# Patient Record
Sex: MALE | Race: WHITE | NOT HISPANIC OR LATINO | URBAN - METROPOLITAN AREA
[De-identification: names, ages, dates, MRNs, and addresses within clinical notes are randomized per-mention and may not be internally consistent; named-entity substitution may affect disease eponyms.]

---

## 2017-06-13 ENCOUNTER — OUTPATIENT (OUTPATIENT)
Dept: OUTPATIENT SERVICES | Facility: HOSPITAL | Age: 62
LOS: 1 days | End: 2017-06-13
Payer: COMMERCIAL

## 2017-06-13 VITALS
DIASTOLIC BLOOD PRESSURE: 61 MMHG | RESPIRATION RATE: 16 BRPM | HEIGHT: 70 IN | TEMPERATURE: 98 F | HEART RATE: 67 BPM | WEIGHT: 207.01 LBS | SYSTOLIC BLOOD PRESSURE: 102 MMHG

## 2017-06-13 DIAGNOSIS — Z98.89 OTHER SPECIFIED POSTPROCEDURAL STATES: Chronic | ICD-10-CM

## 2017-06-13 DIAGNOSIS — Z98.1 ARTHRODESIS STATUS: Chronic | ICD-10-CM

## 2017-06-13 DIAGNOSIS — Z01.812 ENCOUNTER FOR PREPROCEDURAL LABORATORY EXAMINATION: ICD-10-CM

## 2017-06-13 DIAGNOSIS — M16.12 UNILATERAL PRIMARY OSTEOARTHRITIS, LEFT HIP: ICD-10-CM

## 2017-06-13 LAB
ALBUMIN SERPL ELPH-MCNC: 3.8 G/DL — SIGNIFICANT CHANGE UP (ref 3.3–5)
ALP SERPL-CCNC: 96 U/L — SIGNIFICANT CHANGE UP (ref 40–120)
ALT FLD-CCNC: 25 U/L — SIGNIFICANT CHANGE UP (ref 12–78)
ANION GAP SERPL CALC-SCNC: 4 MMOL/L — LOW (ref 5–17)
APTT BLD: 31.9 SEC — SIGNIFICANT CHANGE UP (ref 27.5–37.4)
AST SERPL-CCNC: 21 U/L — SIGNIFICANT CHANGE UP (ref 15–37)
BILIRUB SERPL-MCNC: 0.6 MG/DL — SIGNIFICANT CHANGE UP (ref 0.2–1.2)
BUN SERPL-MCNC: 18 MG/DL — SIGNIFICANT CHANGE UP (ref 7–23)
CALCIUM SERPL-MCNC: 8.8 MG/DL — SIGNIFICANT CHANGE UP (ref 8.5–10.1)
CHLORIDE SERPL-SCNC: 102 MMOL/L — SIGNIFICANT CHANGE UP (ref 96–108)
CO2 SERPL-SCNC: 32 MMOL/L — HIGH (ref 22–31)
CREAT SERPL-MCNC: 0.82 MG/DL — SIGNIFICANT CHANGE UP (ref 0.5–1.3)
GLUCOSE SERPL-MCNC: 96 MG/DL — SIGNIFICANT CHANGE UP (ref 70–99)
HCT VFR BLD CALC: 44.7 % — SIGNIFICANT CHANGE UP (ref 39–50)
HGB BLD-MCNC: 15.3 G/DL — SIGNIFICANT CHANGE UP (ref 13–17)
INR BLD: 0.96 RATIO — SIGNIFICANT CHANGE UP (ref 0.88–1.16)
MCHC RBC-ENTMCNC: 31.7 PG — SIGNIFICANT CHANGE UP (ref 27–34)
MCHC RBC-ENTMCNC: 34.2 GM/DL — SIGNIFICANT CHANGE UP (ref 32–36)
MCV RBC AUTO: 92.8 FL — SIGNIFICANT CHANGE UP (ref 80–100)
MRSA PCR RESULT.: SIGNIFICANT CHANGE UP
PLATELET # BLD AUTO: 265 K/UL — SIGNIFICANT CHANGE UP (ref 150–400)
POTASSIUM SERPL-MCNC: 4.6 MMOL/L — SIGNIFICANT CHANGE UP (ref 3.5–5.3)
POTASSIUM SERPL-SCNC: 4.6 MMOL/L — SIGNIFICANT CHANGE UP (ref 3.5–5.3)
PROT SERPL-MCNC: 7.2 G/DL — SIGNIFICANT CHANGE UP (ref 6–8.3)
PROTHROM AB SERPL-ACNC: 10.5 SEC — SIGNIFICANT CHANGE UP (ref 9.8–12.7)
RBC # BLD: 4.82 M/UL — SIGNIFICANT CHANGE UP (ref 4.2–5.8)
RBC # FLD: 11.6 % — SIGNIFICANT CHANGE UP (ref 10.3–14.5)
S AUREUS DNA NOSE QL NAA+PROBE: SIGNIFICANT CHANGE UP
SODIUM SERPL-SCNC: 138 MMOL/L — SIGNIFICANT CHANGE UP (ref 135–145)
WBC # BLD: 7.5 K/UL — SIGNIFICANT CHANGE UP (ref 3.8–10.5)
WBC # FLD AUTO: 7.5 K/UL — SIGNIFICANT CHANGE UP (ref 3.8–10.5)

## 2017-06-13 PROCEDURE — 93010 ELECTROCARDIOGRAM REPORT: CPT

## 2017-06-13 PROCEDURE — 73502 X-RAY EXAM HIP UNI 2-3 VIEWS: CPT | Mod: 26,LT

## 2017-06-13 NOTE — H&P PST ADULT - NSANTHOSAYNRD_GEN_A_CORE
No. XANDER screening performed.  STOP BANG Legend: 0-2 = LOW Risk; 3-4 = INTERMEDIATE Risk; 5-8 = HIGH Risk

## 2017-06-13 NOTE — H&P PST ADULT - PMH
Back injury Back injury    High cholesterol    Primary osteoarthritis of left hip Back injury    High cholesterol    Low back pain, unspecified back pain laterality, unspecified chronicity, with sciatica presence unspecified    Pain of both hip joints    Primary osteoarthritis of left hip    Primary osteoarthritis of right hip

## 2017-06-13 NOTE — H&P PST ADULT - HISTORY OF PRESENT ILLNESS
61 yo male presents to PST with bilateral hip secondary to bone on bone OA. Reports left hip hurts more then right. Rates his pain today a 7/10. Takes Percocet and Meloxicam. H/O of back pain. S/P lumbar fusion with instrumentation in 2016. Has had 2 epidural injections this year, thus far. Ambulates with a cane. Now scheduled for a left total hip replacement on 6/21 with Dr. Leigh. States that he will have the right hip done possibly on the 27th. 63 yo male presents to PST with bilateral hip pain secondary to bone on bone OA. Reports left hip hurts more then right. Rates his pain today a 7/10. Takes Percocet and Meloxicam. H/O of back pain. S/P lumbar fusion with instrumentation in 2016. Has had 2 epidural injections this year, thus far. Ambulates with a cane. Now scheduled for a left total hip replacement on 6/21 with Dr. Leigh. States that he will have the right hip done possibly on the 27th.

## 2017-06-13 NOTE — H&P PST ADULT - ASSESSMENT
63 yo male with OA of the left hip scheduled for a left total hip replacement on 6/21 with Dr. Leigh

## 2017-06-14 DIAGNOSIS — Z01.818 ENCOUNTER FOR OTHER PREPROCEDURAL EXAMINATION: ICD-10-CM

## 2017-06-14 DIAGNOSIS — M16.12 UNILATERAL PRIMARY OSTEOARTHRITIS, LEFT HIP: ICD-10-CM

## 2017-06-20 PROCEDURE — 80053 COMPREHEN METABOLIC PANEL: CPT

## 2017-06-20 PROCEDURE — 87641 MR-STAPH DNA AMP PROBE: CPT

## 2017-06-20 PROCEDURE — 73502 X-RAY EXAM HIP UNI 2-3 VIEWS: CPT

## 2017-06-20 PROCEDURE — 87640 STAPH A DNA AMP PROBE: CPT

## 2017-06-20 PROCEDURE — 85610 PROTHROMBIN TIME: CPT

## 2017-06-20 PROCEDURE — 86901 BLOOD TYPING SEROLOGIC RH(D): CPT

## 2017-06-20 PROCEDURE — 86900 BLOOD TYPING SEROLOGIC ABO: CPT

## 2017-06-20 PROCEDURE — 85730 THROMBOPLASTIN TIME PARTIAL: CPT

## 2017-06-20 PROCEDURE — 86850 RBC ANTIBODY SCREEN: CPT

## 2017-06-20 PROCEDURE — G0463: CPT

## 2017-06-20 PROCEDURE — 86920 COMPATIBILITY TEST SPIN: CPT

## 2017-06-20 PROCEDURE — 93005 ELECTROCARDIOGRAM TRACING: CPT

## 2017-06-20 PROCEDURE — 85027 COMPLETE CBC AUTOMATED: CPT

## 2017-06-20 RX ORDER — CELECOXIB 200 MG/1
200 CAPSULE ORAL
Qty: 0 | Refills: 0 | Status: DISCONTINUED | OUTPATIENT
Start: 2017-06-21 | End: 2017-06-26

## 2017-06-20 RX ORDER — HYDROMORPHONE HYDROCHLORIDE 2 MG/ML
4 INJECTION INTRAMUSCULAR; INTRAVENOUS; SUBCUTANEOUS EVERY 4 HOURS
Qty: 0 | Refills: 0 | Status: DISCONTINUED | OUTPATIENT
Start: 2017-06-21 | End: 2017-06-26

## 2017-06-20 RX ORDER — ATORVASTATIN CALCIUM 80 MG/1
10 TABLET, FILM COATED ORAL AT BEDTIME
Qty: 0 | Refills: 0 | Status: DISCONTINUED | OUTPATIENT
Start: 2017-06-21 | End: 2017-06-26

## 2017-06-20 RX ORDER — ASCORBIC ACID 60 MG
500 TABLET,CHEWABLE ORAL
Qty: 0 | Refills: 0 | Status: DISCONTINUED | OUTPATIENT
Start: 2017-06-21 | End: 2017-06-26

## 2017-06-20 RX ORDER — FERROUS SULFATE 325(65) MG
325 TABLET ORAL
Qty: 0 | Refills: 0 | Status: DISCONTINUED | OUTPATIENT
Start: 2017-06-21 | End: 2017-06-26

## 2017-06-20 RX ORDER — DOCUSATE SODIUM 100 MG
100 CAPSULE ORAL THREE TIMES A DAY
Qty: 0 | Refills: 0 | Status: DISCONTINUED | OUTPATIENT
Start: 2017-06-21 | End: 2017-06-26

## 2017-06-20 RX ORDER — ACETAMINOPHEN 500 MG
650 TABLET ORAL EVERY 8 HOURS
Qty: 0 | Refills: 0 | Status: COMPLETED | OUTPATIENT
Start: 2017-06-22 | End: 2017-06-24

## 2017-06-20 RX ORDER — FOLIC ACID 0.8 MG
1 TABLET ORAL DAILY
Qty: 0 | Refills: 0 | Status: DISCONTINUED | OUTPATIENT
Start: 2017-06-21 | End: 2017-06-26

## 2017-06-20 RX ORDER — PANTOPRAZOLE SODIUM 20 MG/1
40 TABLET, DELAYED RELEASE ORAL
Qty: 0 | Refills: 0 | Status: DISCONTINUED | OUTPATIENT
Start: 2017-06-21 | End: 2017-06-26

## 2017-06-20 RX ORDER — SODIUM CHLORIDE 9 MG/ML
1000 INJECTION, SOLUTION INTRAVENOUS
Qty: 0 | Refills: 0 | Status: DISCONTINUED | OUTPATIENT
Start: 2017-06-21 | End: 2017-06-21

## 2017-06-20 RX ORDER — ONDANSETRON 8 MG/1
4 TABLET, FILM COATED ORAL EVERY 6 HOURS
Qty: 0 | Refills: 0 | Status: DISCONTINUED | OUTPATIENT
Start: 2017-06-21 | End: 2017-06-26

## 2017-06-20 RX ORDER — HYDROMORPHONE HYDROCHLORIDE 2 MG/ML
2 INJECTION INTRAMUSCULAR; INTRAVENOUS; SUBCUTANEOUS EVERY 4 HOURS
Qty: 0 | Refills: 0 | Status: DISCONTINUED | OUTPATIENT
Start: 2017-06-21 | End: 2017-06-26

## 2017-06-20 RX ORDER — GABAPENTIN 400 MG/1
300 CAPSULE ORAL THREE TIMES A DAY
Qty: 0 | Refills: 0 | Status: DISCONTINUED | OUTPATIENT
Start: 2017-06-21 | End: 2017-06-26

## 2017-06-21 ENCOUNTER — INPATIENT (INPATIENT)
Facility: HOSPITAL | Age: 62
LOS: 7 days | Discharge: HOME HEALTH | DRG: 462 | End: 2017-06-29
Attending: ORTHOPAEDIC SURGERY | Admitting: ORTHOPAEDIC SURGERY
Payer: COMMERCIAL

## 2017-06-21 VITALS
SYSTOLIC BLOOD PRESSURE: 105 MMHG | DIASTOLIC BLOOD PRESSURE: 64 MMHG | HEIGHT: 70 IN | WEIGHT: 207.01 LBS | TEMPERATURE: 98 F | HEART RATE: 66 BPM | OXYGEN SATURATION: 98 % | RESPIRATION RATE: 13 BRPM

## 2017-06-21 DIAGNOSIS — Z98.1 ARTHRODESIS STATUS: Chronic | ICD-10-CM

## 2017-06-21 DIAGNOSIS — M16.12 UNILATERAL PRIMARY OSTEOARTHRITIS, LEFT HIP: ICD-10-CM

## 2017-06-21 DIAGNOSIS — E78.00 PURE HYPERCHOLESTEROLEMIA, UNSPECIFIED: ICD-10-CM

## 2017-06-21 DIAGNOSIS — Z98.89 OTHER SPECIFIED POSTPROCEDURAL STATES: Chronic | ICD-10-CM

## 2017-06-21 DIAGNOSIS — Z29.9 ENCOUNTER FOR PROPHYLACTIC MEASURES, UNSPECIFIED: ICD-10-CM

## 2017-06-21 LAB
HCT VFR BLD CALC: 41.7 % — SIGNIFICANT CHANGE UP (ref 39–50)
HGB BLD-MCNC: 14.3 G/DL — SIGNIFICANT CHANGE UP (ref 13–17)

## 2017-06-21 PROCEDURE — 99281 EMR DPT VST MAYX REQ PHY/QHP: CPT

## 2017-06-21 PROCEDURE — 73501 X-RAY EXAM HIP UNI 1 VIEW: CPT | Mod: 26,LT

## 2017-06-21 PROCEDURE — 88305 TISSUE EXAM BY PATHOLOGIST: CPT | Mod: 26

## 2017-06-21 PROCEDURE — 88311 DECALCIFY TISSUE: CPT | Mod: 26

## 2017-06-21 RX ORDER — ACETAMINOPHEN 500 MG
1000 TABLET ORAL ONCE
Qty: 0 | Refills: 0 | Status: COMPLETED | OUTPATIENT
Start: 2017-06-21 | End: 2017-06-21

## 2017-06-21 RX ORDER — SODIUM CHLORIDE 9 MG/ML
1000 INJECTION, SOLUTION INTRAVENOUS
Qty: 0 | Refills: 0 | Status: DISCONTINUED | OUTPATIENT
Start: 2017-06-21 | End: 2017-06-21

## 2017-06-21 RX ORDER — MORPHINE SULFATE 50 MG/1
2 CAPSULE, EXTENDED RELEASE ORAL EVERY 4 HOURS
Qty: 0 | Refills: 0 | Status: DISCONTINUED | OUTPATIENT
Start: 2017-06-21 | End: 2017-06-26

## 2017-06-21 RX ORDER — ENOXAPARIN SODIUM 100 MG/ML
40 INJECTION SUBCUTANEOUS DAILY
Qty: 0 | Refills: 0 | Status: COMPLETED | OUTPATIENT
Start: 2017-06-22 | End: 2017-06-25

## 2017-06-21 RX ORDER — MELOXICAM 15 MG/1
1 TABLET ORAL
Qty: 0 | Refills: 0 | COMMUNITY

## 2017-06-21 RX ORDER — CEFAZOLIN SODIUM 1 G
1000 VIAL (EA) INJECTION EVERY 6 HOURS
Qty: 0 | Refills: 0 | Status: COMPLETED | OUTPATIENT
Start: 2017-06-21 | End: 2017-06-22

## 2017-06-21 RX ORDER — CEFAZOLIN SODIUM 1 G
2000 VIAL (EA) INJECTION ONCE
Qty: 0 | Refills: 0 | Status: COMPLETED | OUTPATIENT
Start: 2017-06-21 | End: 2017-06-21

## 2017-06-21 RX ORDER — BUPIVACAINE 13.3 MG/ML
20 INJECTION, SUSPENSION, LIPOSOMAL INFILTRATION ONCE
Qty: 0 | Refills: 0 | Status: DISCONTINUED | OUTPATIENT
Start: 2017-06-21 | End: 2017-06-21

## 2017-06-21 RX ORDER — HYDROMORPHONE HYDROCHLORIDE 2 MG/ML
0.5 INJECTION INTRAMUSCULAR; INTRAVENOUS; SUBCUTANEOUS
Qty: 0 | Refills: 0 | Status: DISCONTINUED | OUTPATIENT
Start: 2017-06-21 | End: 2017-06-21

## 2017-06-21 RX ORDER — GABAPENTIN 400 MG/1
1 CAPSULE ORAL
Qty: 0 | Refills: 0 | COMMUNITY

## 2017-06-21 RX ORDER — ATORVASTATIN CALCIUM 80 MG/1
1 TABLET, FILM COATED ORAL
Qty: 0 | Refills: 0 | COMMUNITY

## 2017-06-21 RX ORDER — CYCLOBENZAPRINE HYDROCHLORIDE 10 MG/1
10 TABLET, FILM COATED ORAL
Qty: 0 | Refills: 0 | COMMUNITY

## 2017-06-21 RX ORDER — ACETAMINOPHEN 500 MG
1000 TABLET ORAL ONCE
Qty: 0 | Refills: 0 | Status: COMPLETED | OUTPATIENT
Start: 2017-06-22 | End: 2017-06-22

## 2017-06-21 RX ADMIN — SODIUM CHLORIDE 150 MILLILITER(S): 9 INJECTION, SOLUTION INTRAVENOUS at 14:01

## 2017-06-21 RX ADMIN — HYDROMORPHONE HYDROCHLORIDE 2 MILLIGRAM(S): 2 INJECTION INTRAMUSCULAR; INTRAVENOUS; SUBCUTANEOUS at 15:31

## 2017-06-21 RX ADMIN — SODIUM CHLORIDE 30 MILLILITER(S): 9 INJECTION, SOLUTION INTRAVENOUS at 08:50

## 2017-06-21 RX ADMIN — CELECOXIB 200 MILLIGRAM(S): 200 CAPSULE ORAL at 18:02

## 2017-06-21 RX ADMIN — Medication 1000 MILLIGRAM(S): at 18:00

## 2017-06-21 RX ADMIN — Medication 100 MILLIGRAM(S): at 16:29

## 2017-06-21 RX ADMIN — HYDROMORPHONE HYDROCHLORIDE 4 MILLIGRAM(S): 2 INJECTION INTRAMUSCULAR; INTRAVENOUS; SUBCUTANEOUS at 21:51

## 2017-06-21 RX ADMIN — Medication 325 MILLIGRAM(S): at 18:02

## 2017-06-21 RX ADMIN — GABAPENTIN 300 MILLIGRAM(S): 400 CAPSULE ORAL at 21:14

## 2017-06-21 RX ADMIN — Medication 100 MILLIGRAM(S): at 21:14

## 2017-06-21 RX ADMIN — HYDROMORPHONE HYDROCHLORIDE 0.5 MILLIGRAM(S): 2 INJECTION INTRAMUSCULAR; INTRAVENOUS; SUBCUTANEOUS at 14:00

## 2017-06-21 RX ADMIN — ATORVASTATIN CALCIUM 10 MILLIGRAM(S): 80 TABLET, FILM COATED ORAL at 21:14

## 2017-06-21 RX ADMIN — HYDROMORPHONE HYDROCHLORIDE 0.5 MILLIGRAM(S): 2 INJECTION INTRAMUSCULAR; INTRAVENOUS; SUBCUTANEOUS at 14:15

## 2017-06-21 RX ADMIN — HYDROMORPHONE HYDROCHLORIDE 0.5 MILLIGRAM(S): 2 INJECTION INTRAMUSCULAR; INTRAVENOUS; SUBCUTANEOUS at 13:30

## 2017-06-21 RX ADMIN — Medication 500 MILLIGRAM(S): at 18:02

## 2017-06-21 RX ADMIN — HYDROMORPHONE HYDROCHLORIDE 0.5 MILLIGRAM(S): 2 INJECTION INTRAMUSCULAR; INTRAVENOUS; SUBCUTANEOUS at 13:40

## 2017-06-21 RX ADMIN — Medication 400 MILLIGRAM(S): at 17:10

## 2017-06-21 RX ADMIN — HYDROMORPHONE HYDROCHLORIDE 4 MILLIGRAM(S): 2 INJECTION INTRAMUSCULAR; INTRAVENOUS; SUBCUTANEOUS at 20:51

## 2017-06-21 RX ADMIN — CELECOXIB 200 MILLIGRAM(S): 200 CAPSULE ORAL at 19:02

## 2017-06-21 RX ADMIN — Medication 100 MILLIGRAM(S): at 22:29

## 2017-06-21 RX ADMIN — HYDROMORPHONE HYDROCHLORIDE 0.5 MILLIGRAM(S): 2 INJECTION INTRAMUSCULAR; INTRAVENOUS; SUBCUTANEOUS at 13:15

## 2017-06-21 NOTE — DISCHARGE NOTE ADULT - MEDICATION SUMMARY - MEDICATIONS TO TAKE
I will START or STAY ON the medications listed below when I get home from the hospital:    celecoxib 200 mg oral capsule  -- 1 cap(s) by mouth 2 times a day (after meals)  -- Indication: For Pain     HYDROmorphone 2 mg oral tablet  -- 1 tab(s) by mouth every 6 hours MDD:4  -- Indication: For Pain    Aspirin Enteric Coated 325 mg oral delayed release tablet  -- 1 tab(s) by mouth 2 times a day  -- Indication: For DVT PORPHYLAXIS, TO PREVENT BLOO CLOT    meloxicam 15 mg oral tablet  -- 1 tab(s) by mouth once a day  -- Indication: For HOME MEDICATION     gabapentin 300 mg oral capsule  -- 1 cap(s) by mouth 3 times a day  -- Indication: For HOME MEDICATION     atorvastatin 10 mg oral tablet  -- 1 tab(s) by mouth once a day  -- Indication: For HOME MEDICATION     cyclobenzaprine 10 mg oral tablet  -- 1 tab(s) by mouth every 8 hours, As Needed MDD:3  -- Indication: For HOME MEDICATION     Flexeril  -- 10 milligram(s) by mouth 3 times a day, As Needed - for severe pain  -- Indication: For HOME MEDICATION     pantoprazole 40 mg oral delayed release tablet  -- 1 tab(s) by mouth once a day  -- Indication: For PrEVENT STRESS ULCER

## 2017-06-21 NOTE — DISCHARGE NOTE ADULT - CARE PLAN
Principal Discharge DX:	Osteoarthritis of hips, bilateral  Goal:	PHYSICAL THERAPY  Instructions for follow-up, activity and diet:	WEIGHT BEARING AS TOLERATED.  FOLLOW UP WITH DR. FLOWERS NEXT THURSDAY 07/06/2017 CALL [phone] FOR AN APPOINTMENT.  KEEP HIP AQUACEL  DRESSING  ON DRY AND CLEAN, IT WILL BE CHANGED OR REMOVED ON YOUR NEXT OFFICE VISIT.  Secondary Diagnosis:	High cholesterol Principal Discharge DX:	Osteoarthritis of hips, bilateral  Goal:	PHYSICAL THERAPY  Instructions for follow-up, activity and diet:	WEIGHT BEARING AS TOLERATED.  FOLLOW UP WITH DR. FLOWERS NEXT THURSDAY 07/06/2017 CALL [phone] FOR AN APPOINTMENT.  KEEP HIP AQUACEL  DRESSING  ON DRY AND CLEAN, IT WILL BE CHANGED OR REMOVED ON YOUR NEXT OFFICE VISIT.  Secondary Diagnosis:	High cholesterol Principal Discharge DX:	Osteoarthritis of hips, bilateral  Goal:	PHYSICAL THERAPY  Instructions for follow-up, activity and diet:	WEIGHT BEARING AS TOLERATED.  FOLLOW UP WITH DR. FLOWERS NEXT THURSDAY 07/06/2017 CALL [phone] FOR AN APPOINTMENT.  KEEP HIP AQUACEL  DRESSING  ON DRY AND CLEAN, IT WILL BE CHANGED OR REMOVED ON YOUR NEXT OFFICE VISIT.  Secondary Diagnosis:	High cholesterol

## 2017-06-21 NOTE — PATIENT PROFILE ADULT. - PMH
Back injury    High cholesterol    Low back pain, unspecified back pain laterality, unspecified chronicity, with sciatica presence unspecified    Pain of both hip joints    Primary osteoarthritis of left hip    Primary osteoarthritis of right hip

## 2017-06-21 NOTE — CONSULT NOTE ADULT - CONSULT REQUESTED DATE/TIME
Pt states she needs to have refill of her medicine  Refill: fluticasone nasal spray   [facility]  
Refill done   
21-Jun-2017 15:56

## 2017-06-21 NOTE — DISCHARGE NOTE ADULT - PLAN OF CARE
PHYSICAL THERAPY WEIGHT BEARING AS TOLERATED.  FOLLOW UP WITH DR. FLOWERS NEXT THURSDAY 07/06/2017 CALL [phone] FOR AN APPOINTMENT.  KEEP HIP AQUACEL  DRESSING  ON DRY AND CLEAN, IT WILL BE CHANGED OR REMOVED ON YOUR NEXT OFFICE VISIT.

## 2017-06-21 NOTE — PROGRESS NOTE ADULT - ASSESSMENT
62M s/p left DUDLEY POD# 0    WBAT  Pain control  DVT prophylaxis  Abduction while supine/seated  PT  Discharge planning

## 2017-06-21 NOTE — DISCHARGE NOTE ADULT - DURABLE MEDICAL EQUIPMENT AGENCY
[facility] [phone] all [facility] medical provided hip kit /  [phone]    patient already has a hip kit and 3:1 commode from prior

## 2017-06-21 NOTE — CONSULT NOTE ADULT - SUBJECTIVE AND OBJECTIVE BOX
Patient is a 62y old  Male who presents with a chief complaint of "left hip on the 21st, then the right hip later" (13 Jun 2017 13:15) now s/p left thr.           pmd: wing    PAST MEDICAL & SURGICAL HISTORY:  Low back pain, unspecified back pain laterality, unspecified chronicity, with sciatica presence unspecified  Primary osteoarthritis of right hip  Pain of both hip joints  Primary osteoarthritis of left hip  High cholesterol  Back injury  S/P lumbar spinal fusion: June 2016  S/P left knee arthroscopy      REVIEW OF SYSTEMS:  CONSTITUTIONAL: No fever, weight loss, or fatigue  EYES: No eye pain, visual disturbances, or discharge  ENMT:  No difficulty hearing, tinnitus, vertigo; No sinus or throat pain  NECK: No pain or stiffness  BREASTS: No pain, masses, or nipple discharge  RESPIRATORY: No cough, wheezing, chills or hemoptysis; No shortness of breath  CARDIOVASCULAR: No chest pain, palpitations, dizziness, or leg swelling  GASTROINTESTINAL: No abdominal or epigastric pain. No nausea, vomiting, or hematemesis; No diarrhea or constipation. No melena or hematochezia.  GENITOURINARY: No dysuria, frequency, hematuria, or incontinence  NEUROLOGICAL: No headaches, memory loss, loss of strength, numbness, or tremors  SKIN: No itching, burning, rashes, or lesions   LYMPH NODES: No enlarged glands  ENDOCRINE: No heat or cold intolerance; No hair loss  MUSCULOSKELETAL: No muscle or back pain  PSYCHIATRIC: No depression, anxiety, mood swings, or difficulty sleeping  HEME/LYMPH: No easy bruising, or bleeding gums  ALLERGY AND IMMUNOLOGIC: No hives or eczema     HOME MEDS  · 	acetaminophen-oxyCODONE 325 mg-10 mg oral tablet: Last Dose Taken:  , 1 tab(s) orally every 4 hours, As Needed MDD:6  · 	cyclobenzaprine 10 mg oral tablet: Last Dose Taken:  , 1 tab(s) orally every 8 hours, As Needed MDD:3  · 	gabapentin 300 mg oral capsule: Last Dose Taken:  , 1 cap(s) orally 3 times a day  · 	meloxicam 15 mg oral tablet: Last Dose Taken:  , 1 tab(s) orally once a day  · 	atorvastatin 10 mg oral tablet: Last Dose Taken:  , 1 tab(s) orally once a day    MEDICATIONS  (STANDING):  lactated ringers. 1000milliLiter(s) IV Continuous <Continuous>  celecoxib 200milliGRAM(s) Oral two times a day after meals  docusate sodium 100milliGRAM(s) Oral three times a day  ferrous    sulfate 325milliGRAM(s) Oral three times a day with meals  folic acid 1milliGRAM(s) Oral daily  multivitamin 1Tablet(s) Oral daily  ascorbic acid 500milliGRAM(s) Oral two times a day  ceFAZolin   IVPB 1000milliGRAM(s) IV Intermittent every 6 hours  gabapentin 300milliGRAM(s) Oral three times a day  atorvastatin 10milliGRAM(s) Oral at bedtime  pantoprazole    Tablet 40milliGRAM(s) Oral before breakfast  acetaminophen  IVPB. 1000milliGRAM(s) IV Intermittent once    MEDICATIONS  (PRN):  ondansetron Injectable 4milliGRAM(s) IV Push every 6 hours PRN Nausea and/or Vomiting  HYDROmorphone   Tablet 2milliGRAM(s) Oral every 4 hours PRN Moderate Pain (4 - 6)  HYDROmorphone   Tablet 4milliGRAM(s) Oral every 4 hours PRN Severe Pain (7 - 10)  morphine  - Injectable 2milliGRAM(s) IV Push every 4 hours PRN Severe Pain (7 - 10)        No Known Allergies          SOCIAL HISTORY  tobacco:  alcohol:  substance use:  marital status:          PHYSICAL EXAM  Height (cm): 177.8 (06-21 @ 08:27), 177.8 (06-13 @ 16:52)  Weight (kg): 93.9 (06-21 @ 08:27), 93.9 (06-13 @ 16:52)  BMI (kg/m2): 29.7 (06-21 @ 08:27), 29.7 (06-13 @ 16:52)  BSA (m2): 2.12 (06-21 @ 08:27), 2.12 (06-13 @ 16:52)  Vital Signs Last 24 Hrs  T(C): 36.4, Max: 37.1 (06-21 @ 13:10)  T(F): 97.6, Max: 98.8 (06-21 @ 13:10)  HR: 91 (66 - 91)  BP: 118/80 (105/64 - 142/90)  BP(mean): --  RR: 16 (13 - 16)  SpO2: 100% (98% - 100%)    GENERAL: NAD, well-groomed, well-developed  HEAD:  Atraumatic, Normocephalic  EYES: EOMI, PERRLA, conjunctiva and sclera clear  ENMT: No tonsillar erythema, exudates, or enlargement  NECK: Supple, No JVD  NERVOUS SYSTEM:  Alert & Oriented X3, Good concentration; Motor Strength 5/5 B/L upper and lower extremities; DTRs 2+ intact and symmetric  CHEST/LUNG: Clear to percussion bilaterally; No rales, rhonchi, wheezing, or rubs  HEART: Regular rate and rhythm; No murmurs, rubs, or gallops  ABDOMEN: Soft, Nontender, Nondistended; Bowel sounds present  EXTREMITIES:  2+ Peripheral Pulses, No clubbing, cyanosis, or edema  LYMPH: No lymphadenopathy noted  SKIN: No rashes or lesions      LABS:                        14.3   x     )-----------( x        ( 21 Jun 2017 14:22 )             41.7               CAPILLARY BLOOD GLUCOSE      RADIOLOGY & ADDITIONAL STUDIES: Patient is a 62y old  Male who presents with a chief complaint of "left hip on the 21st, then the right hip later" (13 Jun 2017 13:15) now s/p left thr. pt seen and examined no chest pain or sob.           pmd: wing    PAST MEDICAL & SURGICAL HISTORY:  Low back pain, unspecified back pain laterality, unspecified chronicity, with sciatica presence unspecified  Primary osteoarthritis of right hip  Pain of both hip joints  Primary osteoarthritis of left hip  High cholesterol  Back injury  S/P lumbar spinal fusion: June 2016  S/P left knee arthroscopy      REVIEW OF SYSTEMS:  CONSTITUTIONAL: No fever, weight loss, or fatigue  EYES: No eye pain, visual disturbances, or discharge  ENMT:  No difficulty hearing, tinnitus, vertigo; No sinus or throat pain  NECK: No pain or stiffness  BREASTS: No pain, masses, or nipple discharge  RESPIRATORY: No cough, wheezing, chills or hemoptysis; No shortness of breath  CARDIOVASCULAR: No chest pain, palpitations, dizziness, or leg swelling  GASTROINTESTINAL: No abdominal or epigastric pain. No nausea, vomiting, or hematemesis; No diarrhea or constipation. No melena or hematochezia.  GENITOURINARY: No dysuria, frequency, hematuria, or incontinence  NEUROLOGICAL: No headaches, memory loss, loss of strength, numbness, or tremors  SKIN: No itching, burning, rashes, or lesions   LYMPH NODES: No enlarged glands  ENDOCRINE: No heat or cold intolerance; No hair loss  MUSCULOSKELETAL: pain surg site and left anterior thigh  PSYCHIATRIC: No depression, anxiety, mood swings, or difficulty sleeping  HEME/LYMPH: No easy bruising, or bleeding gums  ALLERGY AND IMMUNOLOGIC: No hives or eczema     HOME MEDS  · 	acetaminophen-oxyCODONE 325 mg-10 mg oral tablet: Last Dose Taken:  , 1 tab(s) orally every 4 hours, As Needed MDD:6  · 	cyclobenzaprine 10 mg oral tablet: Last Dose Taken:  , 1 tab(s) orally every 8 hours, As Needed MDD:3  · 	gabapentin 300 mg oral capsule: Last Dose Taken:  , 1 cap(s) orally 3 times a day  · 	meloxicam 15 mg oral tablet: Last Dose Taken:  , 1 tab(s) orally once a day  · 	atorvastatin 10 mg oral tablet: Last Dose Taken:  , 1 tab(s) orally once a day    MEDICATIONS  (STANDING):  lactated ringers. 1000milliLiter(s) IV Continuous <Continuous>  celecoxib 200milliGRAM(s) Oral two times a day after meals  docusate sodium 100milliGRAM(s) Oral three times a day  ferrous    sulfate 325milliGRAM(s) Oral three times a day with meals  folic acid 1milliGRAM(s) Oral daily  multivitamin 1Tablet(s) Oral daily  ascorbic acid 500milliGRAM(s) Oral two times a day  ceFAZolin   IVPB 1000milliGRAM(s) IV Intermittent every 6 hours  gabapentin 300milliGRAM(s) Oral three times a day  atorvastatin 10milliGRAM(s) Oral at bedtime  pantoprazole    Tablet 40milliGRAM(s) Oral before breakfast  acetaminophen  IVPB. 1000milliGRAM(s) IV Intermittent once    MEDICATIONS  (PRN):  ondansetron Injectable 4milliGRAM(s) IV Push every 6 hours PRN Nausea and/or Vomiting  HYDROmorphone   Tablet 2milliGRAM(s) Oral every 4 hours PRN Moderate Pain (4 - 6)  HYDROmorphone   Tablet 4milliGRAM(s) Oral every 4 hours PRN Severe Pain (7 - 10)  morphine  - Injectable 2milliGRAM(s) IV Push every 4 hours PRN Severe Pain (7 - 10)        No Known Allergies          SOCIAL HISTORY  tobacco: no  alcohol: no  substance use: no  marital status:           PHYSICAL EXAM  Height (cm): 177.8 (06-21 @ 08:27), 177.8 (06-13 @ 16:52)  Weight (kg): 93.9 (06-21 @ 08:27), 93.9 (06-13 @ 16:52)  BMI (kg/m2): 29.7 (06-21 @ 08:27), 29.7 (06-13 @ 16:52)  BSA (m2): 2.12 (06-21 @ 08:27), 2.12 (06-13 @ 16:52)  Vital Signs Last 24 Hrs  T(C): 36.4, Max: 37.1 (06-21 @ 13:10)  T(F): 97.6, Max: 98.8 (06-21 @ 13:10)  HR: 91 (66 - 91)  BP: 118/80 (105/64 - 142/90)  BP(mean): --  RR: 16 (13 - 16)  SpO2: 100% (98% - 100%) on 2 liters o2    GENERAL: NAD, well-groomed, well-developed  HEAD:  Atraumatic, Normocephalic  EYES: EOMI, PERRLA, conjunctiva and sclera clear  ENMT: No tonsillar erythema, exudates, or enlargement  NECK: Supple, No JVD  NERVOUS SYSTEM:  Alert & Oriented X3, Good concentration; Motor Strength 5/5 B/L upper and lower extremities; DTRs 2+ intact and symmetric  CHEST/LUNG: Clear to percussion bilaterally; No rales, rhonchi, wheezing, or rubs  HEART: Regular rate and rhythm; No murmurs, rubs, or gallops  ABDOMEN: Soft, Nontender, Nondistended; Bowel sounds present  EXTREMITIES:  2+ Peripheral Pulses, No clubbing, cyanosis, or edema pain left surg site, dressing intact, pain  left anterior thigh   LYMPH: No lymphadenopathy noted  SKIN: No rashes or lesions      LABS:                        14.3   x     )-----------( x        ( 21 Jun 2017 14:22 )             41.7               CAPILLARY BLOOD GLUCOSE      RADIOLOGY & ADDITIONAL STUDIES:

## 2017-06-21 NOTE — DISCHARGE NOTE ADULT - PATIENT PORTAL LINK FT
“You can access the Follow[employer] Patient Portal, offered by [facility], by registering with the following website: [email or web address]”

## 2017-06-21 NOTE — CONSULT NOTE ADULT - PROBLEM SELECTOR RECOMMENDATION 9
physical ther eval  cont celebrex  daily labs ortho follow up pain meds prn   may have right hip done as per dr lott

## 2017-06-21 NOTE — PHYSICAL THERAPY INITIAL EVALUATION ADULT - RANGE OF MOTION EXAMINATION, REHAB EVAL
L THP/bilateral upper extremity ROM was WFL (within functional limits)/bilateral lower extremity ROM was WFL (within functional limits)

## 2017-06-21 NOTE — DISCHARGE NOTE ADULT - CARE PROVIDER_API CALL
Matthew Leigh), Orthopaedic Surgery  [address] [place] [address]  Phone: [phone]  Fax: [phone]

## 2017-06-21 NOTE — DISCHARGE NOTE ADULT - MEDICATION SUMMARY - MEDICATIONS TO STOP TAKING
I will STOP taking the medications listed below when I get home from the hospital:    acetaminophen-oxyCODONE 325 mg-10 mg oral tablet  -- 1 tab(s) by mouth every 4 hours, As Needed MDD:6  -- Caution federal law prohibits the transfer of this drug to any person other  than the person for whom it was prescribed.  May cause drowsiness.  Alcohol may intensify this effect.  Use care when operating dangerous machinery.  This prescription cannot be refilled.  This product contains acetaminophen.  Do not use  with any other product containing acetaminophen to prevent possible liver damage.  Using more of this medication than prescribed may cause serious breathing problems.

## 2017-06-22 LAB
ANION GAP SERPL CALC-SCNC: 4 MMOL/L — LOW (ref 5–17)
BUN SERPL-MCNC: 17 MG/DL — SIGNIFICANT CHANGE UP (ref 7–23)
CALCIUM SERPL-MCNC: 8.5 MG/DL — SIGNIFICANT CHANGE UP (ref 8.5–10.1)
CHLORIDE SERPL-SCNC: 103 MMOL/L — SIGNIFICANT CHANGE UP (ref 96–108)
CO2 SERPL-SCNC: 30 MMOL/L — SIGNIFICANT CHANGE UP (ref 22–31)
CREAT SERPL-MCNC: 0.8 MG/DL — SIGNIFICANT CHANGE UP (ref 0.5–1.3)
GLUCOSE SERPL-MCNC: 115 MG/DL — HIGH (ref 70–99)
HCT VFR BLD CALC: 38.1 % — LOW (ref 39–50)
HGB BLD-MCNC: 13 G/DL — SIGNIFICANT CHANGE UP (ref 13–17)
POTASSIUM SERPL-MCNC: 4.6 MMOL/L — SIGNIFICANT CHANGE UP (ref 3.5–5.3)
POTASSIUM SERPL-SCNC: 4.6 MMOL/L — SIGNIFICANT CHANGE UP (ref 3.5–5.3)
SODIUM SERPL-SCNC: 137 MMOL/L — SIGNIFICANT CHANGE UP (ref 135–145)

## 2017-06-22 RX ORDER — ZOLPIDEM TARTRATE 10 MG/1
5 TABLET ORAL AT BEDTIME
Qty: 0 | Refills: 0 | Status: DISCONTINUED | OUTPATIENT
Start: 2017-06-22 | End: 2017-06-26

## 2017-06-22 RX ADMIN — Medication 500 MILLIGRAM(S): at 05:44

## 2017-06-22 RX ADMIN — Medication 1000 MILLIGRAM(S): at 03:13

## 2017-06-22 RX ADMIN — HYDROMORPHONE HYDROCHLORIDE 4 MILLIGRAM(S): 2 INJECTION INTRAMUSCULAR; INTRAVENOUS; SUBCUTANEOUS at 10:00

## 2017-06-22 RX ADMIN — Medication 100 MILLIGRAM(S): at 13:03

## 2017-06-22 RX ADMIN — HYDROMORPHONE HYDROCHLORIDE 4 MILLIGRAM(S): 2 INJECTION INTRAMUSCULAR; INTRAVENOUS; SUBCUTANEOUS at 18:48

## 2017-06-22 RX ADMIN — Medication 650 MILLIGRAM(S): at 13:03

## 2017-06-22 RX ADMIN — Medication 650 MILLIGRAM(S): at 22:36

## 2017-06-22 RX ADMIN — Medication 325 MILLIGRAM(S): at 08:59

## 2017-06-22 RX ADMIN — GABAPENTIN 300 MILLIGRAM(S): 400 CAPSULE ORAL at 05:44

## 2017-06-22 RX ADMIN — Medication 325 MILLIGRAM(S): at 13:03

## 2017-06-22 RX ADMIN — Medication 100 MILLIGRAM(S): at 05:44

## 2017-06-22 RX ADMIN — Medication 1 MILLIGRAM(S): at 13:03

## 2017-06-22 RX ADMIN — ENOXAPARIN SODIUM 40 MILLIGRAM(S): 100 INJECTION SUBCUTANEOUS at 13:03

## 2017-06-22 RX ADMIN — ATORVASTATIN CALCIUM 10 MILLIGRAM(S): 80 TABLET, FILM COATED ORAL at 22:36

## 2017-06-22 RX ADMIN — CELECOXIB 200 MILLIGRAM(S): 200 CAPSULE ORAL at 10:00

## 2017-06-22 RX ADMIN — CELECOXIB 200 MILLIGRAM(S): 200 CAPSULE ORAL at 08:59

## 2017-06-22 RX ADMIN — Medication 100 MILLIGRAM(S): at 22:36

## 2017-06-22 RX ADMIN — PANTOPRAZOLE SODIUM 40 MILLIGRAM(S): 20 TABLET, DELAYED RELEASE ORAL at 05:44

## 2017-06-22 RX ADMIN — CELECOXIB 200 MILLIGRAM(S): 200 CAPSULE ORAL at 18:48

## 2017-06-22 RX ADMIN — Medication 325 MILLIGRAM(S): at 17:35

## 2017-06-22 RX ADMIN — Medication 500 MILLIGRAM(S): at 17:36

## 2017-06-22 RX ADMIN — Medication 400 MILLIGRAM(S): at 02:58

## 2017-06-22 RX ADMIN — Medication 1 TABLET(S): at 17:36

## 2017-06-22 RX ADMIN — Medication 100 MILLIGRAM(S): at 04:23

## 2017-06-22 RX ADMIN — Medication 650 MILLIGRAM(S): at 05:44

## 2017-06-22 RX ADMIN — CELECOXIB 200 MILLIGRAM(S): 200 CAPSULE ORAL at 17:35

## 2017-06-22 RX ADMIN — HYDROMORPHONE HYDROCHLORIDE 4 MILLIGRAM(S): 2 INJECTION INTRAMUSCULAR; INTRAVENOUS; SUBCUTANEOUS at 17:36

## 2017-06-22 RX ADMIN — GABAPENTIN 300 MILLIGRAM(S): 400 CAPSULE ORAL at 17:36

## 2017-06-22 RX ADMIN — GABAPENTIN 300 MILLIGRAM(S): 400 CAPSULE ORAL at 22:36

## 2017-06-22 RX ADMIN — HYDROMORPHONE HYDROCHLORIDE 4 MILLIGRAM(S): 2 INJECTION INTRAMUSCULAR; INTRAVENOUS; SUBCUTANEOUS at 08:59

## 2017-06-22 NOTE — PROGRESS NOTE ADULT - SUBJECTIVE AND OBJECTIVE BOX
DEPARTMENT OF ANESTHESIA  POST ANESTHETIC EVALUATION    The Patient was interviewed and evaluated    Vital Signs Last 24 Hrs  T(C): 36.9, Max: 37.1 (06-21 @ 13:10)  T(F): 98.4, Max: 98.8 (06-21 @ 13:10)  HR: 75 (72 - 93)  BP: 98/66 (97/63 - 142/90)  BP(mean): --  RR: 16 (14 - 17)  SpO2: 95% (95% - 100%)    Evaluation:      (x ) No apparent complications or complaints regarding anesthesia care at this time  (x ) All questions were answered    Condition:  (x ) Stable      ( ) Guarded      ( ) Critical    Recommendations:  (x ) None     ( ) Other:

## 2017-06-22 NOTE — PROGRESS NOTE ADULT - SUBJECTIVE AND OBJECTIVE BOX
CHELSITRENA BETANCUR 62y Male  MRN-[id number]     ORTHOPEDIC SURGERY / DR. FLOWERS    POD # 1    Vital Signs Last 24 Hrs  T(C): 36.8, Max: 37.1 (06-21 @ 13:10)  T(F): 98.2, Max: 98.8 (06-21 @ 13:10)  HR: 72 (66 - 93)  BP: 107/68 (97/63 - 142/90)  BP(mean): --  RR: 16 (13 - 17)  SpO2: 97% (97% - 100%)    RIGHT KNEE :    DRESSING DRY AND INTACT  GOOD MOTOR TO  RIGHT LOWER EXTREMITY  NEURO-VASCULAR STATUS INTACT  NO CALF TENDERNESS      POST OP    Hemoglobin: 14.3 (06-21 @ 14:22)  Hematocrit: 41.7 (06-21 @ 14:22)      ASSESSMENT &  PLAN:  POD # 1 S/P  RIGHT TOTAL KNEE  REPLACEMENT    WEIGHT  BEARING AS TOLERATED, OOB AND AMBULATE, PHYSICAL THERAPY   DVT PROPHYLAXIS HEPARIN 5000 UNITS SQ EVERY 8 HOURS  INCENTIVE SPIROMETRY   MEDICAL CONSULT NOTED  NEPHROLOGY CONSULT DR. ISAACS  PLAN FOR LEFT TKR ON MONDAY 06/26/2017 TRENA RINCON      62y   male  MRN-[id number]    ORTHOPEDIC SURGERY / DR. FLOWERS    POD # 1    Vital Signs Last 24 Hrs  T(C): 36.8, Max: 37.1 (06-21 @ 13:10)  T(F): 98.2, Max: 98.8 (06-21 @ 13:10)  HR: 72 (66 - 93)  BP: 107/68 (97/63 - 142/90)  BP(mean): --  RR: 16 (13 - 17)  SpO2: 97% (97% - 100%)    LEFT HIP :    DRESSING DRY AND INTACT  GOOD MOTOR TO LEFT LOWER EXTREMITY  NEURO-VASCULAR STATUS INTACT  NO CALF TENDERNESS    POST OP    Hemoglobin: 14.3 (06-21 @ 14:22)  Hematocrit: 41.7 (06-21 @ 14:22)                                ASSESSMENT &  PLAN:  POD # 1 S/P LEFT TOTAL HIP REPLACEMENT    WEIGHT  BEARING AS TOLERATED, OOB AND AMBULATE, PHYSICAL THERAPY   DVT PROPHYLAXIS LOVENOX 40 MG SQ DAILY  INCENTIVE SPIROMETRY   PLAN FOR A RIGHT THR ON MONDAY 06/26/2017  DISCHARGE PLANNING TO HOME WITH HOME CARE SERGEYTRENA      62y   male  MRN-[id number]    ORTHOPEDIC SURGERY / DR. FLOWERS    POD # 1    Vital Signs Last 24 Hrs  T(C): 36.8, Max: 37.1 (06-21 @ 13:10)  T(F): 98.2, Max: 98.8 (06-21 @ 13:10)  HR: 72 (66 - 93)  BP: 107/68 (97/63 - 142/90)  BP(mean): --  RR: 16 (13 - 17)  SpO2: 97% (97% - 100%)    LEFT HIP :    DRESSING DRY AND INTACT  GOOD MOTOR TO LEFT LOWER EXTREMITY  NEURO-VASCULAR STATUS INTACT  NO CALF TENDERNESS    POST OP    Hemoglobin: 14.3 (06-21 @ 14:22)  Hematocrit: 41.7 (06-21 @ 14:22)    Hemoglobin + Hematocrit (06.22.17 @ 05:12)      Hemoglobin: 13.0 g/dL    Hematocrit: 38.1 %    Basic Metabolic Panel (06.22.17 @ 05:12)    Sodium, Serum: 137 mmol/L    Potassium, Serum: 4.6 mmol/L    Chloride, Serum: 103 mmol/L    Carbon Dioxide, Serum: 30 mmol/L    Anion Gap, Serum: 4 mmol/L    Blood Urea Nitrogen, Serum: 17 mg/dL    Creatinine, Serum: 0.80 mg/dL    Glucose, Serum: 115 mg/dL    Calcium, Total Serum: 8.5 mg/dL                              ASSESSMENT &  PLAN:  POD # 1 S/P LEFT TOTAL HIP REPLACEMENT    WEIGHT  BEARING AS TOLERATED, OOB AND AMBULATE, PHYSICAL THERAPY   DVT PROPHYLAXIS LOVENOX 40 MG SQ DAILY  INCENTIVE SPIROMETRY   PLAN FOR A RIGHT THR ON MONDAY 06/26/2017  DISCHARGE PLANNING TO HOME WITH HOME CARE

## 2017-06-23 LAB
HCT VFR BLD CALC: 34.8 % — LOW (ref 39–50)
HGB BLD-MCNC: 11.9 G/DL — LOW (ref 13–17)
SURGICAL PATHOLOGY FINAL REPORT - CH: SIGNIFICANT CHANGE UP

## 2017-06-23 RX ORDER — VANCOMYCIN HCL 1 G
1500 VIAL (EA) INTRAVENOUS ONCE
Qty: 0 | Refills: 0 | Status: COMPLETED | OUTPATIENT
Start: 2017-06-26 | End: 2017-06-26

## 2017-06-23 RX ORDER — SODIUM CHLORIDE 9 MG/ML
1000 INJECTION, SOLUTION INTRAVENOUS
Qty: 0 | Refills: 0 | Status: DISCONTINUED | OUTPATIENT
Start: 2017-06-25 | End: 2017-06-26

## 2017-06-23 RX ORDER — BUPIVACAINE 13.3 MG/ML
20 INJECTION, SUSPENSION, LIPOSOMAL INFILTRATION ONCE
Qty: 0 | Refills: 0 | Status: DISCONTINUED | OUTPATIENT
Start: 2017-06-26 | End: 2017-06-26

## 2017-06-23 RX ORDER — GLYCERIN ADULT
1 SUPPOSITORY, RECTAL RECTAL ONCE
Qty: 0 | Refills: 0 | Status: COMPLETED | OUTPATIENT
Start: 2017-06-23 | End: 2017-06-23

## 2017-06-23 RX ADMIN — Medication 325 MILLIGRAM(S): at 08:30

## 2017-06-23 RX ADMIN — GABAPENTIN 300 MILLIGRAM(S): 400 CAPSULE ORAL at 16:22

## 2017-06-23 RX ADMIN — ZOLPIDEM TARTRATE 5 MILLIGRAM(S): 10 TABLET ORAL at 00:21

## 2017-06-23 RX ADMIN — GABAPENTIN 300 MILLIGRAM(S): 400 CAPSULE ORAL at 06:01

## 2017-06-23 RX ADMIN — HYDROMORPHONE HYDROCHLORIDE 4 MILLIGRAM(S): 2 INJECTION INTRAMUSCULAR; INTRAVENOUS; SUBCUTANEOUS at 15:00

## 2017-06-23 RX ADMIN — CELECOXIB 200 MILLIGRAM(S): 200 CAPSULE ORAL at 18:40

## 2017-06-23 RX ADMIN — PANTOPRAZOLE SODIUM 40 MILLIGRAM(S): 20 TABLET, DELAYED RELEASE ORAL at 06:01

## 2017-06-23 RX ADMIN — Medication 650 MILLIGRAM(S): at 06:01

## 2017-06-23 RX ADMIN — GABAPENTIN 300 MILLIGRAM(S): 400 CAPSULE ORAL at 21:28

## 2017-06-23 RX ADMIN — Medication 500 MILLIGRAM(S): at 06:01

## 2017-06-23 RX ADMIN — Medication 1 TABLET(S): at 12:35

## 2017-06-23 RX ADMIN — Medication 100 MILLIGRAM(S): at 06:01

## 2017-06-23 RX ADMIN — Medication 100 MILLIGRAM(S): at 21:28

## 2017-06-23 RX ADMIN — Medication 325 MILLIGRAM(S): at 12:35

## 2017-06-23 RX ADMIN — Medication 650 MILLIGRAM(S): at 14:57

## 2017-06-23 RX ADMIN — Medication 100 MILLIGRAM(S): at 14:57

## 2017-06-23 RX ADMIN — Medication 500 MILLIGRAM(S): at 18:27

## 2017-06-23 RX ADMIN — CELECOXIB 200 MILLIGRAM(S): 200 CAPSULE ORAL at 18:27

## 2017-06-23 RX ADMIN — CELECOXIB 200 MILLIGRAM(S): 200 CAPSULE ORAL at 08:30

## 2017-06-23 RX ADMIN — Medication 325 MILLIGRAM(S): at 18:27

## 2017-06-23 RX ADMIN — ENOXAPARIN SODIUM 40 MILLIGRAM(S): 100 INJECTION SUBCUTANEOUS at 12:35

## 2017-06-23 RX ADMIN — ATORVASTATIN CALCIUM 10 MILLIGRAM(S): 80 TABLET, FILM COATED ORAL at 21:28

## 2017-06-23 RX ADMIN — Medication 1 SUPPOSITORY(S): at 14:58

## 2017-06-23 RX ADMIN — ZOLPIDEM TARTRATE 5 MILLIGRAM(S): 10 TABLET ORAL at 23:24

## 2017-06-23 RX ADMIN — Medication 1 MILLIGRAM(S): at 12:35

## 2017-06-23 RX ADMIN — Medication 650 MILLIGRAM(S): at 21:28

## 2017-06-23 NOTE — PROGRESS NOTE ADULT - SUBJECTIVE AND OBJECTIVE BOX
SERGEYTRENA      62y   male  MRN-[id number]    ORTHOPEDIC SURGERY / DR. FLOWERS    POD # 2    Vital Signs Last 24 Hrs  T(C): 36.8, Max: 37.3 (06-22 @ 17:02)  T(F): 98.3, Max: 99.1 (06-22 @ 17:02)  HR: 73 (73 - 89)  BP: 111/74 (90/59 - 118/85)  BP(mean): --  RR: 16 (16 - 16)  SpO2: 100% (95% - 100%)    LEFT HIP :    DRESSING DRY AND INTACT  GOOD MOTOR TO LEFT LOWER EXTREMITY  NEURO-VASCULAR STATUS INTACT  NO CALF TENDERNESS    Hemoglobin: 11.9 (06-23 @ 04:58)  Hemoglobin: 13.0 (06-22 @ 05:12)  Hemoglobin: 14.3 (06-21 @ 14:22)    Hematocrit: 34.8 (06-23 @ 04:58)  Hematocrit: 38.1 (06-22 @ 05:12)  Hematocrit: 41.7 (06-21 @ 14:22)    06-22    137  |  103  |  17  ----------------------------<  115<H>  4.6   |  30  |  0.80    Ca    8.5      22 Jun 2017 05:12                              ASSESSMENT &  PLAN:  POD # 2 S/P LEFT TOTAL HIP REPLACEMENT    WEIGHT  BEARING AS TOLERATED, OOB AND AMBULATE, PHYSICAL THERAPY   DVT PROPHYLAXIS LOVENOX 40 MG SQ DAILY   INCENTIVE SPIROMETRY     PLAN FOR A RIGHT THR ON MONDAY 06/26 /2017 WILL PREOP     DISCHARGE PLANNING TO HOME WITH HOME CARE

## 2017-06-24 RX ORDER — GLYCERIN ADULT
1 SUPPOSITORY, RECTAL RECTAL DAILY
Qty: 0 | Refills: 0 | Status: DISCONTINUED | OUTPATIENT
Start: 2017-06-24 | End: 2017-06-26

## 2017-06-24 RX ADMIN — Medication 325 MILLIGRAM(S): at 11:26

## 2017-06-24 RX ADMIN — Medication 1 SUPPOSITORY(S): at 18:23

## 2017-06-24 RX ADMIN — Medication 100 MILLIGRAM(S): at 13:06

## 2017-06-24 RX ADMIN — CELECOXIB 200 MILLIGRAM(S): 200 CAPSULE ORAL at 08:18

## 2017-06-24 RX ADMIN — PANTOPRAZOLE SODIUM 40 MILLIGRAM(S): 20 TABLET, DELAYED RELEASE ORAL at 05:10

## 2017-06-24 RX ADMIN — GABAPENTIN 300 MILLIGRAM(S): 400 CAPSULE ORAL at 21:27

## 2017-06-24 RX ADMIN — Medication 500 MILLIGRAM(S): at 17:08

## 2017-06-24 RX ADMIN — Medication 1 TABLET(S): at 11:26

## 2017-06-24 RX ADMIN — ATORVASTATIN CALCIUM 10 MILLIGRAM(S): 80 TABLET, FILM COATED ORAL at 21:26

## 2017-06-24 RX ADMIN — ENOXAPARIN SODIUM 40 MILLIGRAM(S): 100 INJECTION SUBCUTANEOUS at 11:26

## 2017-06-24 RX ADMIN — Medication 100 MILLIGRAM(S): at 21:27

## 2017-06-24 RX ADMIN — GABAPENTIN 300 MILLIGRAM(S): 400 CAPSULE ORAL at 05:10

## 2017-06-24 RX ADMIN — CELECOXIB 200 MILLIGRAM(S): 200 CAPSULE ORAL at 17:14

## 2017-06-24 RX ADMIN — Medication 650 MILLIGRAM(S): at 21:26

## 2017-06-24 RX ADMIN — Medication 1 MILLIGRAM(S): at 11:26

## 2017-06-24 RX ADMIN — Medication 500 MILLIGRAM(S): at 05:10

## 2017-06-24 RX ADMIN — GABAPENTIN 300 MILLIGRAM(S): 400 CAPSULE ORAL at 13:06

## 2017-06-24 RX ADMIN — CELECOXIB 200 MILLIGRAM(S): 200 CAPSULE ORAL at 17:51

## 2017-06-24 RX ADMIN — ZOLPIDEM TARTRATE 5 MILLIGRAM(S): 10 TABLET ORAL at 23:53

## 2017-06-24 RX ADMIN — Medication 650 MILLIGRAM(S): at 05:10

## 2017-06-24 RX ADMIN — Medication 325 MILLIGRAM(S): at 08:18

## 2017-06-24 RX ADMIN — Medication 100 MILLIGRAM(S): at 05:10

## 2017-06-24 RX ADMIN — Medication 650 MILLIGRAM(S): at 13:06

## 2017-06-24 RX ADMIN — CELECOXIB 200 MILLIGRAM(S): 200 CAPSULE ORAL at 09:40

## 2017-06-24 RX ADMIN — Medication 325 MILLIGRAM(S): at 17:08

## 2017-06-24 NOTE — PROGRESS NOTE ADULT - SUBJECTIVE AND OBJECTIVE BOX
Patient seen, chart reviewed, full history on Anesthesia record.  Please keep patient NPO after midnight.

## 2017-06-24 NOTE — PROGRESS NOTE ADULT - SUBJECTIVE AND OBJECTIVE BOX
CHELSITRENA BETANCUR      62y   male  MRN-[id number]    ORTHOPEDIC SURGERY / DR. FLOWERS    POD # 3    Vital Signs Last 24 Hrs  T(C): 36.9, Max: 36.9 (06-23 @ 23:30)  T(F): 98.5, Max: 98.5 (06-24 @ 05:10)  HR: 82 (80 - 89)  BP: 110/75 (103/68 - 118/81)  BP(mean): --  RR: 16 (16 - 16)  SpO2: 98% (16% - 100%)    LEFT HIP :    DRESSING DRY AND INTACT  GOOD MOTOR TO LEFT LOWER EXTREMITY  NEURO-VASCULAR STATUS INTACT  NO CALF TENDERNESS    Hemoglobin: 11.9 (06-23 @ 04:58)  Hemoglobin: 13.0 (06-22 @ 05:12)  Hemoglobin: 14.3 (06-21 @ 14:22)    Hematocrit: 34.8 (06-23 @ 04:58)  Hematocrit: 38.1 (06-22 @ 05:12)  Hematocrit: 41.7 (06-21 @ 14:22)                                ASSESSMENT &  PLAN:  POD # 3 S/P LEFT TOTAL HIP REPLACEMENT    WEIGHT  BEARING AS TOLERATED, OOB AND AMBULATE, PHYSICAL THERAPY   DVT PROPHYLAXIS LOVENOX 40 MG SQ DAILY  INCENTIVE SPIROMETRY     PLAN FOR RIGHT THR ON MONDAY 06/26/2017  PREOP LABS CBC, BMP, PT AND INT, TYPE AND SCREEN, X -RAY RIGHT HIP IN AM 06/25/2017  NPO AFTER MIDNIGHT ON  SUNDAY 06/25, HOLD LOVENOX ON MONDAY 06/26/2017   PREOP VANCOMYCIN 1500 MG TO BE GIVEN IN OR 06/26/2017     DISCHARGE PLANNING TO HOME WITH HOME CARE AFTER THR RIGHT THR

## 2017-06-25 LAB
ANION GAP SERPL CALC-SCNC: 5 MMOL/L — SIGNIFICANT CHANGE UP (ref 5–17)
BUN SERPL-MCNC: 14 MG/DL — SIGNIFICANT CHANGE UP (ref 7–23)
CALCIUM SERPL-MCNC: 8.4 MG/DL — LOW (ref 8.5–10.1)
CHLORIDE SERPL-SCNC: 104 MMOL/L — SIGNIFICANT CHANGE UP (ref 96–108)
CO2 SERPL-SCNC: 31 MMOL/L — SIGNIFICANT CHANGE UP (ref 22–31)
CREAT SERPL-MCNC: 0.57 MG/DL — SIGNIFICANT CHANGE UP (ref 0.5–1.3)
GLUCOSE SERPL-MCNC: 97 MG/DL — SIGNIFICANT CHANGE UP (ref 70–99)
HCT VFR BLD CALC: 37.6 % — LOW (ref 39–50)
HGB BLD-MCNC: 12.9 G/DL — LOW (ref 13–17)
INR BLD: 1 RATIO — SIGNIFICANT CHANGE UP (ref 0.88–1.16)
MCHC RBC-ENTMCNC: 31.8 PG — SIGNIFICANT CHANGE UP (ref 27–34)
MCHC RBC-ENTMCNC: 34.2 GM/DL — SIGNIFICANT CHANGE UP (ref 32–36)
MCV RBC AUTO: 93 FL — SIGNIFICANT CHANGE UP (ref 80–100)
PLATELET # BLD AUTO: 266 K/UL — SIGNIFICANT CHANGE UP (ref 150–400)
POTASSIUM SERPL-MCNC: 3.9 MMOL/L — SIGNIFICANT CHANGE UP (ref 3.5–5.3)
POTASSIUM SERPL-SCNC: 3.9 MMOL/L — SIGNIFICANT CHANGE UP (ref 3.5–5.3)
PROTHROM AB SERPL-ACNC: 10.9 SEC — SIGNIFICANT CHANGE UP (ref 9.8–12.7)
RBC # BLD: 4.05 M/UL — LOW (ref 4.2–5.8)
RBC # FLD: 11.6 % — SIGNIFICANT CHANGE UP (ref 10.3–14.5)
SODIUM SERPL-SCNC: 140 MMOL/L — SIGNIFICANT CHANGE UP (ref 135–145)
WBC # BLD: 7.7 K/UL — SIGNIFICANT CHANGE UP (ref 3.8–10.5)
WBC # FLD AUTO: 7.7 K/UL — SIGNIFICANT CHANGE UP (ref 3.8–10.5)

## 2017-06-25 PROCEDURE — 73501 X-RAY EXAM HIP UNI 1 VIEW: CPT | Mod: 26,RT

## 2017-06-25 RX ORDER — MAGNESIUM HYDROXIDE 400 MG/1
30 TABLET, CHEWABLE ORAL EVERY 12 HOURS
Qty: 0 | Refills: 0 | Status: DISCONTINUED | OUTPATIENT
Start: 2017-06-25 | End: 2017-06-26

## 2017-06-25 RX ADMIN — CELECOXIB 200 MILLIGRAM(S): 200 CAPSULE ORAL at 19:05

## 2017-06-25 RX ADMIN — Medication 100 MILLIGRAM(S): at 21:24

## 2017-06-25 RX ADMIN — CELECOXIB 200 MILLIGRAM(S): 200 CAPSULE ORAL at 17:58

## 2017-06-25 RX ADMIN — Medication 325 MILLIGRAM(S): at 17:58

## 2017-06-25 RX ADMIN — CELECOXIB 200 MILLIGRAM(S): 200 CAPSULE ORAL at 09:01

## 2017-06-25 RX ADMIN — CELECOXIB 200 MILLIGRAM(S): 200 CAPSULE ORAL at 07:47

## 2017-06-25 RX ADMIN — HYDROMORPHONE HYDROCHLORIDE 4 MILLIGRAM(S): 2 INJECTION INTRAMUSCULAR; INTRAVENOUS; SUBCUTANEOUS at 18:02

## 2017-06-25 RX ADMIN — MAGNESIUM HYDROXIDE 30 MILLILITER(S): 400 TABLET, CHEWABLE ORAL at 10:08

## 2017-06-25 RX ADMIN — Medication 500 MILLIGRAM(S): at 05:14

## 2017-06-25 RX ADMIN — ZOLPIDEM TARTRATE 5 MILLIGRAM(S): 10 TABLET ORAL at 23:41

## 2017-06-25 RX ADMIN — PANTOPRAZOLE SODIUM 40 MILLIGRAM(S): 20 TABLET, DELAYED RELEASE ORAL at 05:15

## 2017-06-25 RX ADMIN — GABAPENTIN 300 MILLIGRAM(S): 400 CAPSULE ORAL at 21:24

## 2017-06-25 RX ADMIN — Medication 500 MILLIGRAM(S): at 17:58

## 2017-06-25 RX ADMIN — GABAPENTIN 300 MILLIGRAM(S): 400 CAPSULE ORAL at 14:11

## 2017-06-25 RX ADMIN — HYDROMORPHONE HYDROCHLORIDE 4 MILLIGRAM(S): 2 INJECTION INTRAMUSCULAR; INTRAVENOUS; SUBCUTANEOUS at 15:19

## 2017-06-25 RX ADMIN — HYDROMORPHONE HYDROCHLORIDE 4 MILLIGRAM(S): 2 INJECTION INTRAMUSCULAR; INTRAVENOUS; SUBCUTANEOUS at 09:01

## 2017-06-25 RX ADMIN — Medication 100 MILLIGRAM(S): at 14:11

## 2017-06-25 RX ADMIN — ENOXAPARIN SODIUM 40 MILLIGRAM(S): 100 INJECTION SUBCUTANEOUS at 12:17

## 2017-06-25 RX ADMIN — Medication 325 MILLIGRAM(S): at 12:17

## 2017-06-25 RX ADMIN — Medication 1 MILLIGRAM(S): at 12:17

## 2017-06-25 RX ADMIN — HYDROMORPHONE HYDROCHLORIDE 4 MILLIGRAM(S): 2 INJECTION INTRAMUSCULAR; INTRAVENOUS; SUBCUTANEOUS at 14:11

## 2017-06-25 RX ADMIN — Medication 325 MILLIGRAM(S): at 07:47

## 2017-06-25 RX ADMIN — HYDROMORPHONE HYDROCHLORIDE 4 MILLIGRAM(S): 2 INJECTION INTRAMUSCULAR; INTRAVENOUS; SUBCUTANEOUS at 19:05

## 2017-06-25 RX ADMIN — HYDROMORPHONE HYDROCHLORIDE 4 MILLIGRAM(S): 2 INJECTION INTRAMUSCULAR; INTRAVENOUS; SUBCUTANEOUS at 07:47

## 2017-06-25 RX ADMIN — Medication 1 TABLET(S): at 12:17

## 2017-06-25 RX ADMIN — ATORVASTATIN CALCIUM 10 MILLIGRAM(S): 80 TABLET, FILM COATED ORAL at 21:24

## 2017-06-25 RX ADMIN — Medication 100 MILLIGRAM(S): at 05:15

## 2017-06-25 RX ADMIN — GABAPENTIN 300 MILLIGRAM(S): 400 CAPSULE ORAL at 05:14

## 2017-06-25 RX ADMIN — Medication 1 SUPPOSITORY(S): at 07:51

## 2017-06-25 NOTE — PROGRESS NOTE ADULT - SUBJECTIVE AND OBJECTIVE BOX
TRENA RINCON      62y   male  MRN-[id number]      POD # 4  Vital Signs Last 24 Hrs  T(C): 36.6, Max: 37.2 (06-24 @ 16:00)  T(F): 97.9, Max: 98.9 (06-24 @ 16:00)  HR: 74 (74 - 88)  BP: 114/79 (107/70 - 118/74)  BP(mean): --  RR: 16 (16 - 16)  SpO2: 99% (97% - 99%)  LEFT HIP :    DRESSING DRY AND INTACT  GOOD MOTOR TO LEFT LOWER EXTREMITY  NEURO-VASCULAR STATUS INTACT  NO CALF TENDERNESS                                  ASSESSMENT &  PLAN:  POD # 4 S/P LEFT TOTAL HIP REPLACEMENT    WEIGHT  BEARING AS TOLERATED, OOB AND AMBULATE, PHYSICAL THERAPY   DVT PROPHYLAXIS LOVENOX 40 MG SQ DAILY  INCENTIVE SPIROMETRY     PLAN FOR RIGHT THR ON MONDAY 06/26/2017  PREOP LABS CBC, BMP, PT AND INT, TYPE AND SCREEN, X -RAY RIGHT HIP IN AM 06/25/2017  NPO AFTER MIDNIGHT ON  SUNDAY 06/25, HOLD LOVENOX ON MONDAY 06/26/2017   PREOP VANCOMYCIN 1500 MG TO BE GIVEN IN OR 06/26/2017

## 2017-06-25 NOTE — PROGRESS NOTE ADULT - SUBJECTIVE AND OBJECTIVE BOX
Patient is a 62y old  Male who presents with a chief complaint of BILATERAL HIP PAIN (21 Jun 2017 19:30)      INTERVAL HPI/OVERNIGHT EVENTS: feels well, no pain at rest  T(C): 36.6, Max: 37.2 (06-24 @ 16:00)  HR: 74 (74 - 88)  BP: 114/79 (107/70 - 118/74)  RR: 16 (16 - 16)  SpO2: 99% (97% - 99%)  Wt(kg): --  I&O's Summary      LABS:                        12.9   7.7   )-----------( 266      ( 25 Jun 2017 08:53 )             37.6     06-25    140  |  104  |  14  ----------------------------<  97  3.9   |  31  |  0.57    Ca    8.4<L>      25 Jun 2017 08:53      PT/INR - ( 25 Jun 2017 08:53 )   PT: 10.9 sec;   INR: 1.00 ratio                MEDICATIONS  (STANDING):  celecoxib 200milliGRAM(s) Oral two times a day after meals  docusate sodium 100milliGRAM(s) Oral three times a day  ferrous    sulfate 325milliGRAM(s) Oral three times a day with meals  folic acid 1milliGRAM(s) Oral daily  multivitamin 1Tablet(s) Oral daily  ascorbic acid 500milliGRAM(s) Oral two times a day  gabapentin 300milliGRAM(s) Oral three times a day  atorvastatin 10milliGRAM(s) Oral at bedtime  pantoprazole    Tablet 40milliGRAM(s) Oral before breakfast  lactated ringers. 1000milliLiter(s) IV Continuous <Continuous>    MEDICATIONS  (PRN):  ondansetron Injectable 4milliGRAM(s) IV Push every 6 hours PRN Nausea and/or Vomiting  HYDROmorphone   Tablet 2milliGRAM(s) Oral every 4 hours PRN Moderate Pain (4 - 6)  HYDROmorphone   Tablet 4milliGRAM(s) Oral every 4 hours PRN Severe Pain (7 - 10)  morphine  - Injectable 2milliGRAM(s) IV Push every 4 hours PRN Severe Pain (7 - 10)  zolpidem 5milliGRAM(s) Oral at bedtime PRN Insomnia  glycerin Suppository - Adult 1Suppository(s) Rectal daily PRN Constipation  magnesium hydroxide Suspension 30milliLiter(s) Oral every 12 hours PRN Constipation      REVIEW OF SYSTEMS:  CONSTITUTIONAL: No fever, weight loss, or fatigue  EYES: No eye pain, visual disturbances, or discharge  ENMT:  No difficulty hearing, tinnitus, vertigo; No sinus or throat pain  NECK: No pain or stiffness  RESPIRATORY: No cough, wheezing, chills or hemoptysis; No shortness of breath  CARDIOVASCULAR: No chest pain, palpitations, dizziness, or leg swelling  GASTROINTESTINAL: No abdominal or epigastric pain. No nausea, vomiting, or hematemesis; No diarrhea or constipation. No melena or hematochezia.  GENITOURINARY: No dysuria, frequency, hematuria, or incontinence  NEUROLOGICAL: No headaches, memory loss, loss of strength, numbness, or tremors  SKIN: No itching, burning, rashes, or lesions   LYMPH NODES: No enlarged glands  ENDOCRINE: No heat or cold intolerance; No hair loss  MUSCULOSKELETAL: hip pain  PSYCHIATRIC: No depression, anxiety, mood swings, or difficulty sleeping  HEME/LYMPH: No easy bruising, or bleeding gums  ALLERY AND IMMUNOLOGIC: No hives or eczema    RADIOLOGY & ADDITIONAL TESTS:    Imaging Personally Reviewed:  [x ] YES  [ ] NO    Consultant(s) Notes Reviewed:  [x ] YES  [ ] NO    PHYSICAL EXAM:  GENERAL: NAD, well-groomed, well-developed  HEAD:  Atraumatic, Normocephalic  EYES: EOMI, PERRLA, conjunctiva and sclera clear  ENMT: No tonsillar erythema, exudates, or enlargement; Moist mucous membranes, Good dentition, No lesions  NECK: Supple, No JVD, Normal thyroid  NERVOUS SYSTEM:  Alert & Oriented X3, Good concentration; Motor Strength 5/5 B/L upper and lower extremities; DTRs 2+ intact and symmetric  CHEST/LUNG: Clear to percussion bilaterally; No rales, rhonchi, wheezing, or rubs  HEART: Regular rate and rhythm; No murmurs, rubs, or gallops  ABDOMEN: Soft, Nontender, Nondistended; Bowel sounds present  EXTREMITIES:  2+ Peripheral Pulses, No clubbing, cyanosis, or edema, no calf tenderness  LYMPH: No lymphadenopathy noted  SKIN: No rashes or lesions    Care Discussed with Consultants/Other Providers [x ] YES  [ ] NO

## 2017-06-26 LAB
HCT VFR BLD CALC: 37.8 % — LOW (ref 39–50)
HGB BLD-MCNC: 12.7 G/DL — LOW (ref 13–17)

## 2017-06-26 PROCEDURE — 88311 DECALCIFY TISSUE: CPT | Mod: 26

## 2017-06-26 PROCEDURE — 73501 X-RAY EXAM HIP UNI 1 VIEW: CPT | Mod: 26,RT

## 2017-06-26 PROCEDURE — 88305 TISSUE EXAM BY PATHOLOGIST: CPT | Mod: 26

## 2017-06-26 RX ORDER — FERROUS SULFATE 325(65) MG
325 TABLET ORAL
Qty: 0 | Refills: 0 | Status: DISCONTINUED | OUTPATIENT
Start: 2017-06-26 | End: 2017-06-29

## 2017-06-26 RX ORDER — ONDANSETRON 8 MG/1
4 TABLET, FILM COATED ORAL EVERY 6 HOURS
Qty: 0 | Refills: 0 | Status: DISCONTINUED | OUTPATIENT
Start: 2017-06-26 | End: 2017-06-29

## 2017-06-26 RX ORDER — HYDROMORPHONE HYDROCHLORIDE 2 MG/ML
0.5 INJECTION INTRAMUSCULAR; INTRAVENOUS; SUBCUTANEOUS
Qty: 0 | Refills: 0 | Status: DISCONTINUED | OUTPATIENT
Start: 2017-06-26 | End: 2017-06-26

## 2017-06-26 RX ORDER — OXYCODONE HYDROCHLORIDE 5 MG/1
10 TABLET ORAL EVERY 6 HOURS
Qty: 0 | Refills: 0 | Status: DISCONTINUED | OUTPATIENT
Start: 2017-06-26 | End: 2017-06-26

## 2017-06-26 RX ORDER — GLYCERIN ADULT
1 SUPPOSITORY, RECTAL RECTAL DAILY
Qty: 0 | Refills: 0 | Status: DISCONTINUED | OUTPATIENT
Start: 2017-06-26 | End: 2017-06-29

## 2017-06-26 RX ORDER — OXYCODONE HYDROCHLORIDE 5 MG/1
5 TABLET ORAL EVERY 4 HOURS
Qty: 0 | Refills: 0 | Status: DISCONTINUED | OUTPATIENT
Start: 2017-06-26 | End: 2017-06-26

## 2017-06-26 RX ORDER — HYDROMORPHONE HYDROCHLORIDE 2 MG/ML
4 INJECTION INTRAMUSCULAR; INTRAVENOUS; SUBCUTANEOUS EVERY 4 HOURS
Qty: 0 | Refills: 0 | Status: DISCONTINUED | OUTPATIENT
Start: 2017-06-26 | End: 2017-06-29

## 2017-06-26 RX ORDER — SODIUM CHLORIDE 9 MG/ML
1000 INJECTION, SOLUTION INTRAVENOUS
Qty: 0 | Refills: 0 | Status: DISCONTINUED | OUTPATIENT
Start: 2017-06-26 | End: 2017-06-26

## 2017-06-26 RX ORDER — VANCOMYCIN HCL 1 G
1500 VIAL (EA) INTRAVENOUS EVERY 12 HOURS
Qty: 0 | Refills: 0 | Status: DISCONTINUED | OUTPATIENT
Start: 2017-06-26 | End: 2017-06-26

## 2017-06-26 RX ORDER — ZOLPIDEM TARTRATE 10 MG/1
5 TABLET ORAL AT BEDTIME
Qty: 0 | Refills: 0 | Status: DISCONTINUED | OUTPATIENT
Start: 2017-06-26 | End: 2017-06-29

## 2017-06-26 RX ORDER — VANCOMYCIN HCL 1 G
1500 VIAL (EA) INTRAVENOUS EVERY 12 HOURS
Qty: 0 | Refills: 0 | Status: COMPLETED | OUTPATIENT
Start: 2017-06-26 | End: 2017-06-26

## 2017-06-26 RX ORDER — PANTOPRAZOLE SODIUM 20 MG/1
40 TABLET, DELAYED RELEASE ORAL
Qty: 0 | Refills: 0 | Status: DISCONTINUED | OUTPATIENT
Start: 2017-06-26 | End: 2017-06-29

## 2017-06-26 RX ORDER — ACETAMINOPHEN 500 MG
1000 TABLET ORAL ONCE
Qty: 0 | Refills: 0 | Status: COMPLETED | OUTPATIENT
Start: 2017-06-27 | End: 2017-06-27

## 2017-06-26 RX ORDER — ATORVASTATIN CALCIUM 80 MG/1
10 TABLET, FILM COATED ORAL AT BEDTIME
Qty: 0 | Refills: 0 | Status: DISCONTINUED | OUTPATIENT
Start: 2017-06-26 | End: 2017-06-29

## 2017-06-26 RX ORDER — GABAPENTIN 400 MG/1
300 CAPSULE ORAL THREE TIMES A DAY
Qty: 0 | Refills: 0 | Status: DISCONTINUED | OUTPATIENT
Start: 2017-06-26 | End: 2017-06-29

## 2017-06-26 RX ORDER — ACETAMINOPHEN 500 MG
650 TABLET ORAL EVERY 8 HOURS
Qty: 0 | Refills: 0 | Status: DISCONTINUED | OUTPATIENT
Start: 2017-06-26 | End: 2017-06-29

## 2017-06-26 RX ORDER — ACETAMINOPHEN 500 MG
1000 TABLET ORAL ONCE
Qty: 0 | Refills: 0 | Status: COMPLETED | OUTPATIENT
Start: 2017-06-26 | End: 2017-06-26

## 2017-06-26 RX ORDER — MAGNESIUM HYDROXIDE 400 MG/1
30 TABLET, CHEWABLE ORAL EVERY 12 HOURS
Qty: 0 | Refills: 0 | Status: DISCONTINUED | OUTPATIENT
Start: 2017-06-26 | End: 2017-06-29

## 2017-06-26 RX ORDER — HYDROMORPHONE HYDROCHLORIDE 2 MG/ML
2 INJECTION INTRAMUSCULAR; INTRAVENOUS; SUBCUTANEOUS EVERY 4 HOURS
Qty: 0 | Refills: 0 | Status: DISCONTINUED | OUTPATIENT
Start: 2017-06-26 | End: 2017-06-29

## 2017-06-26 RX ORDER — ASPIRIN/CALCIUM CARB/MAGNESIUM 324 MG
325 TABLET ORAL
Qty: 0 | Refills: 0 | Status: DISCONTINUED | OUTPATIENT
Start: 2017-06-27 | End: 2017-06-29

## 2017-06-26 RX ORDER — FOLIC ACID 0.8 MG
1 TABLET ORAL DAILY
Qty: 0 | Refills: 0 | Status: DISCONTINUED | OUTPATIENT
Start: 2017-06-26 | End: 2017-06-29

## 2017-06-26 RX ORDER — ASCORBIC ACID 60 MG
500 TABLET,CHEWABLE ORAL
Qty: 0 | Refills: 0 | Status: DISCONTINUED | OUTPATIENT
Start: 2017-06-26 | End: 2017-06-29

## 2017-06-26 RX ORDER — DOCUSATE SODIUM 100 MG
100 CAPSULE ORAL THREE TIMES A DAY
Qty: 0 | Refills: 0 | Status: DISCONTINUED | OUTPATIENT
Start: 2017-06-26 | End: 2017-06-29

## 2017-06-26 RX ORDER — CELECOXIB 200 MG/1
200 CAPSULE ORAL
Qty: 0 | Refills: 0 | Status: DISCONTINUED | OUTPATIENT
Start: 2017-06-26 | End: 2017-06-29

## 2017-06-26 RX ORDER — MORPHINE SULFATE 50 MG/1
4 CAPSULE, EXTENDED RELEASE ORAL EVERY 4 HOURS
Qty: 0 | Refills: 0 | Status: DISCONTINUED | OUTPATIENT
Start: 2017-06-26 | End: 2017-06-29

## 2017-06-26 RX ADMIN — CELECOXIB 200 MILLIGRAM(S): 200 CAPSULE ORAL at 18:03

## 2017-06-26 RX ADMIN — HYDROMORPHONE HYDROCHLORIDE 0.5 MILLIGRAM(S): 2 INJECTION INTRAMUSCULAR; INTRAVENOUS; SUBCUTANEOUS at 13:30

## 2017-06-26 RX ADMIN — Medication 300 MILLIGRAM(S): at 10:10

## 2017-06-26 RX ADMIN — HYDROMORPHONE HYDROCHLORIDE 0.5 MILLIGRAM(S): 2 INJECTION INTRAMUSCULAR; INTRAVENOUS; SUBCUTANEOUS at 14:05

## 2017-06-26 RX ADMIN — Medication 500 MILLIGRAM(S): at 17:16

## 2017-06-26 RX ADMIN — SODIUM CHLORIDE 100 MILLILITER(S): 9 INJECTION, SOLUTION INTRAVENOUS at 00:00

## 2017-06-26 RX ADMIN — Medication 100 MILLIGRAM(S): at 21:46

## 2017-06-26 RX ADMIN — HYDROMORPHONE HYDROCHLORIDE 4 MILLIGRAM(S): 2 INJECTION INTRAMUSCULAR; INTRAVENOUS; SUBCUTANEOUS at 17:16

## 2017-06-26 RX ADMIN — HYDROMORPHONE HYDROCHLORIDE 0.5 MILLIGRAM(S): 2 INJECTION INTRAMUSCULAR; INTRAVENOUS; SUBCUTANEOUS at 13:45

## 2017-06-26 RX ADMIN — HYDROMORPHONE HYDROCHLORIDE 4 MILLIGRAM(S): 2 INJECTION INTRAMUSCULAR; INTRAVENOUS; SUBCUTANEOUS at 18:04

## 2017-06-26 RX ADMIN — HYDROMORPHONE HYDROCHLORIDE 2 MILLIGRAM(S): 2 INJECTION INTRAMUSCULAR; INTRAVENOUS; SUBCUTANEOUS at 06:28

## 2017-06-26 RX ADMIN — HYDROMORPHONE HYDROCHLORIDE 4 MILLIGRAM(S): 2 INJECTION INTRAMUSCULAR; INTRAVENOUS; SUBCUTANEOUS at 21:46

## 2017-06-26 RX ADMIN — GABAPENTIN 300 MILLIGRAM(S): 400 CAPSULE ORAL at 04:20

## 2017-06-26 RX ADMIN — Medication 1000 MILLIGRAM(S): at 21:30

## 2017-06-26 RX ADMIN — Medication 300 MILLIGRAM(S): at 21:46

## 2017-06-26 RX ADMIN — HYDROMORPHONE HYDROCHLORIDE 4 MILLIGRAM(S): 2 INJECTION INTRAMUSCULAR; INTRAVENOUS; SUBCUTANEOUS at 22:16

## 2017-06-26 RX ADMIN — SODIUM CHLORIDE 100 MILLILITER(S): 9 INJECTION, SOLUTION INTRAVENOUS at 09:09

## 2017-06-26 RX ADMIN — CELECOXIB 200 MILLIGRAM(S): 200 CAPSULE ORAL at 17:16

## 2017-06-26 RX ADMIN — Medication 325 MILLIGRAM(S): at 17:16

## 2017-06-26 RX ADMIN — ATORVASTATIN CALCIUM 10 MILLIGRAM(S): 80 TABLET, FILM COATED ORAL at 21:46

## 2017-06-26 RX ADMIN — Medication 400 MILLIGRAM(S): at 20:28

## 2017-06-26 RX ADMIN — PANTOPRAZOLE SODIUM 40 MILLIGRAM(S): 20 TABLET, DELAYED RELEASE ORAL at 04:19

## 2017-06-26 RX ADMIN — GABAPENTIN 300 MILLIGRAM(S): 400 CAPSULE ORAL at 21:46

## 2017-06-26 RX ADMIN — HYDROMORPHONE HYDROCHLORIDE 2 MILLIGRAM(S): 2 INJECTION INTRAMUSCULAR; INTRAVENOUS; SUBCUTANEOUS at 06:07

## 2017-06-26 NOTE — PROGRESS NOTE ADULT - SUBJECTIVE AND OBJECTIVE BOX
Post op check    Patient seen and examined. Resting comfortably. No complaints at this time.    Vital Signs Last 24 Hrs  T(C): 37.1, Max: 37.2 (06-25 @ 15:41)  T(F): 98.8, Max: 99 (06-25 @ 15:41)  HR: 79 (71 - 82)  BP: 102/70 (100/66 - 111/70)  BP(mean): --  RR: 14 (14 - 16)  SpO2: 98% (97% - 100%)      Gen: NAD  RLE:  Dsg c/d/i  SILT L2-S1  +EHL/FHL/TA/Gastroc  DP+  Soft compartments, - calf ttp    LLE:  Dsg c/d/i  SILT L2-S1  +EHL/FHL/TA/Gastroc  DP+  Soft compartments, - calf ttp

## 2017-06-26 NOTE — PROGRESS NOTE ADULT - ASSESSMENT
63 YO M s/p R DUDLEY POD 0, L DUDLEY POD5  Pain control  DVT ppx  WBAT B/L LE  PT  Check labs  Dispo planning

## 2017-06-26 NOTE — PHYSICAL THERAPY INITIAL EVALUATION ADULT - TRANSFER TRAINING, PT EVAL
Patient will transfer sit-->stand with SUP in 5 sessions.
sit to stand with rolling walker independently in 2-3 sessions

## 2017-06-26 NOTE — PROGRESS NOTE ADULT - SUBJECTIVE AND OBJECTIVE BOX
TRENA RINCON      62y   male  MRN-[id number]    ORTHOPEDIC SURGERY / DR. FLOWERS    POD # 5    Vital Signs Last 24 Hrs  T(C): 36.8, Max: 36.8 (06-25 @ 15:20)  T(F): 98.2, Max: 98.3 (06-25 @ 15:20)  HR: 74 (74 - 74)  BP: 110/67 (100/66 - 114/79)  BP(mean): --  RR: 16 (16 - 16)  SpO2: 99% (99% - 99%)    LEFT HIP :    WOUND DRY AND INTACT  SOME EDEMA  GOOD MOTOR TO LEFT LOWER EXTREMITY  NEURO-VASCULAR STATUS INTACT  NO CALF TENDERNESS      Hemoglobin: 12.9 (06-25 @ 08:53)  Hematocrit: 37.6 (06-25 @ 08:53)    06-25    140  |  104  |  14  ----------------------------<  97  3.9   |  31  |  0.57    Ca    8.4<L>      25 Jun 2017 08:53    Prothrombin Time and INR, Plasma (06.25.17 @ 08:53)    Prothrombin Time, Plasma: 10.9: Effective March 21st, the reference range for PT has changed. sec    INR: 1.00: Please note: New Critical Value: 5.0 ratio as of 1/2/14. ratio      PROCEDURE DATE:  06/25/2017      INTERPRETATION:  Preop for THR.    AP portable right hip.    Very severe end-stage osteoarthritic change with marked joint space   narrowing, juxta-articular sclerosis and subchondral cyst formation   involving the femoral head and acetabulum. No acute osseous abnormality.   Incidental metallic fusion lower lumbar spine noted.    Impression: Severe osteoarthritis right hip                               ASSESSMENT &  PLAN:  POD # 5 S/P LEFT TOTAL HIP REPLACEMENT             WEIGHT  BEARING AS TOLERATED, OOB AND AMBULATE, PHYSICAL THERAPY   DVT PROPHYLAXIS HOLD LOVENOX  TODAY  INCENTIVE SPIROMETRY     PLAN FOR A RIGHT THR TODAY   PREOP LABS NOTED, TYPE AND SCREEN DONE, MEDICALLY CLEARED BY DR. PERLMAN     DISCHARGE PLANNING TO HOME WITH HOME CARE    NEW AQUACEL DRESSING APPLIED

## 2017-06-26 NOTE — BRIEF OPERATIVE NOTE - PROCEDURE
Total hip arthroplasty  06/26/2017    Active  AMEGAS
THR (total hip replacement)  06/21/2017  LEFT  Active  HNOOROLLAH

## 2017-06-26 NOTE — BRIEF OPERATIVE NOTE - PRE-OP DX
Arthritis  06/26/2017    Active  Serafin Curtis  Osteoarthritis of left hip, unspecified osteoarthritis type  06/21/2017    Active  Deniz Power Osteoarthritis of left hip, unspecified osteoarthritis type  06/21/2017    Active  Deniz Power  Osteoarthritis of right hip, unspecified osteoarthritis type  06/28/2017    Active  Deniz Power

## 2017-06-26 NOTE — PROGRESS NOTE ADULT - SUBJECTIVE AND OBJECTIVE BOX
Patient is a 62y old  Male who presents with a chief complaint of BILATERAL HIP PAIN (21 Jun 2017 19:30)  s/p left thr today.  feels well. no pain presently      T(C): 36.7, Max: 37.2 (06-26 @ 09:11)  HR: 82 (71 - 85)  BP: 110/72 (100/70 - 111/70)  RR: 16 (14 - 16)  SpO2: 100% (97% - 100%)  Wt(kg): --  I&O's Summary  I & Os for 24h ending 26 Jun 2017 07:00  =============================================  IN: 700 ml / OUT: 600 ml / NET: 100 ml    I & Os for current day (as of 26 Jun 2017 21:52)  =============================================  IN: 0 ml / OUT: 1175 ml / NET: -1175 ml      LABS:                        12.7   x     )-----------( x        ( 26 Jun 2017 14:42 )             37.8     06-25    140  |  104  |  14  ----------------------------<  97  3.9   |  31  |  0.57    Ca    8.4<L>      25 Jun 2017 08:53      PT/INR - ( 25 Jun 2017 08:53 )   PT: 10.9 sec;   INR: 1.00 ratio                  MEDICATIONS  (STANDING):  gabapentin 300milliGRAM(s) Oral three times a day  atorvastatin 10milliGRAM(s) Oral at bedtime  pantoprazole    Tablet 40milliGRAM(s) Oral before breakfast  celecoxib 200milliGRAM(s) Oral two times a day after meals  docusate sodium 100milliGRAM(s) Oral three times a day  ferrous    sulfate 325milliGRAM(s) Oral three times a day with meals  folic acid 1milliGRAM(s) Oral daily  multivitamin 1Tablet(s) Oral daily  ascorbic acid 500milliGRAM(s) Oral two times a day  acetaminophen   Tablet 650milliGRAM(s) Oral every 8 hours    MEDICATIONS  (PRN):  ondansetron Injectable 4milliGRAM(s) IV Push every 6 hours PRN Nausea and/or Vomiting  HYDROmorphone   Tablet 2milliGRAM(s) Oral every 4 hours PRN Moderate Pain (4 - 6)  HYDROmorphone   Tablet 4milliGRAM(s) Oral every 4 hours PRN Severe Pain (7 - 10)  morphine  - Injectable 4milliGRAM(s) IV Push every 4 hours PRN Severe Pain (7 - 10)  zolpidem 5milliGRAM(s) Oral at bedtime PRN Insomnia  glycerin Suppository - Adult 1Suppository(s) Rectal daily PRN Constipation  magnesium hydroxide Suspension 30milliLiter(s) Oral every 12 hours PRN Constipation      REVIEW OF SYSTEMS:  CONSTITUTIONAL: No fever, weight loss, or fatigue  EYES: No eye pain, visual disturbances, or discharge  ENMT:  No difficulty hearing, tinnitus, vertigo; No sinus or throat pain  NECK: No pain or stiffness  RESPIRATORY: No cough, wheezing, chills or hemoptysis; No shortness of breath  CARDIOVASCULAR: No chest pain, palpitations, dizziness, or leg swelling  GASTROINTESTINAL: No abdominal or epigastric pain. No nausea, vomiting, or hematemesis; No diarrhea or constipation. No melena or hematochezia.  GENITOURINARY: No dysuria, frequency, hematuria, or incontinence  NEUROLOGICAL: No headaches, memory loss, loss of strength, numbness, or tremors  SKIN: No itching, burning, rashes, or lesions   LYMPH NODES: No enlarged glands  ENDOCRINE: No heat or cold intolerance; No hair loss  MUSCULOSKELETAL: mild hip pain, much improved  PSYCHIATRIC: No depression, anxiety, mood swings, or difficulty sleeping  HEME/LYMPH: No easy bruising, or bleeding gums  ALLERY AND IMMUNOLOGIC: No hives or eczema    RADIOLOGY & ADDITIONAL TESTS:    Imaging Personally Reviewed:  [ ] YES  [ ] NO    Consultant(s) Notes Reviewed:  [x ] YES  [ ] NO    PHYSICAL EXAM:  GENERAL: NAD, well-groomed, well-developed  HEAD:  Atraumatic, Normocephalic  EYES: EOMI, PERRLA, conjunctiva and sclera clear  ENMT: No tonsillar erythema, exudates, or enlargement; Moist mucous membranes, Good dentition, No lesions  NECK: Supple, No JVD, Normal thyroid  NERVOUS SYSTEM:  Alert & Oriented X3, Good concentration; Motor Strength 5/5 B/L upper and lower extremities; DTRs 2+ intact and symmetric  CHEST/LUNG: Clear to percussion bilaterally; No rales, rhonchi, wheezing, or rubs  HEART: Regular rate and rhythm; No murmurs, rubs, or gallops  ABDOMEN: Soft, Nontender, Nondistended; Bowel sounds present  EXTREMITIES:  2+ Peripheral Pulses, No clubbing, cyanosis, or edema, no calf tenderness to palp  LYMPH: No lymphadenopathy noted  SKIN: No rashes or lesions    Care Discussed with Consultants/Other Providers [ ] YES  [ ] NO

## 2017-06-26 NOTE — PHYSICAL THERAPY INITIAL EVALUATION ADULT - GAIT TRAINING, PT EVAL
Patient will ambulate 125 feet with rolling walker and SUP in 5 sessions.
ambulate 150 feet with rolling walker independently in 2-3 sessions

## 2017-06-27 DIAGNOSIS — I95.9 HYPOTENSION, UNSPECIFIED: ICD-10-CM

## 2017-06-27 DIAGNOSIS — D50.0 IRON DEFICIENCY ANEMIA SECONDARY TO BLOOD LOSS (CHRONIC): ICD-10-CM

## 2017-06-27 LAB
ANION GAP SERPL CALC-SCNC: 6 MMOL/L — SIGNIFICANT CHANGE UP (ref 5–17)
BUN SERPL-MCNC: 10 MG/DL — SIGNIFICANT CHANGE UP (ref 7–23)
CALCIUM SERPL-MCNC: 8.2 MG/DL — LOW (ref 8.5–10.1)
CHLORIDE SERPL-SCNC: 102 MMOL/L — SIGNIFICANT CHANGE UP (ref 96–108)
CO2 SERPL-SCNC: 31 MMOL/L — SIGNIFICANT CHANGE UP (ref 22–31)
CREAT SERPL-MCNC: 0.64 MG/DL — SIGNIFICANT CHANGE UP (ref 0.5–1.3)
GLUCOSE SERPL-MCNC: 122 MG/DL — HIGH (ref 70–99)
HCT VFR BLD CALC: 32.4 % — LOW (ref 39–50)
HCT VFR BLD CALC: 33.2 % — LOW (ref 39–50)
HGB BLD-MCNC: 11 G/DL — LOW (ref 13–17)
HGB BLD-MCNC: 11.5 G/DL — LOW (ref 13–17)
POTASSIUM SERPL-MCNC: 4 MMOL/L — SIGNIFICANT CHANGE UP (ref 3.5–5.3)
POTASSIUM SERPL-SCNC: 4 MMOL/L — SIGNIFICANT CHANGE UP (ref 3.5–5.3)
SODIUM SERPL-SCNC: 139 MMOL/L — SIGNIFICANT CHANGE UP (ref 135–145)

## 2017-06-27 RX ORDER — SODIUM CHLORIDE 9 MG/ML
250 INJECTION INTRAMUSCULAR; INTRAVENOUS; SUBCUTANEOUS ONCE
Qty: 0 | Refills: 0 | Status: COMPLETED | OUTPATIENT
Start: 2017-06-27 | End: 2017-06-27

## 2017-06-27 RX ORDER — SODIUM CHLORIDE 9 MG/ML
1000 INJECTION INTRAMUSCULAR; INTRAVENOUS; SUBCUTANEOUS
Qty: 0 | Refills: 0 | Status: DISCONTINUED | OUTPATIENT
Start: 2017-06-27 | End: 2017-06-28

## 2017-06-27 RX ADMIN — Medication 1 MILLIGRAM(S): at 12:39

## 2017-06-27 RX ADMIN — HYDROMORPHONE HYDROCHLORIDE 2 MILLIGRAM(S): 2 INJECTION INTRAMUSCULAR; INTRAVENOUS; SUBCUTANEOUS at 23:48

## 2017-06-27 RX ADMIN — GABAPENTIN 300 MILLIGRAM(S): 400 CAPSULE ORAL at 21:39

## 2017-06-27 RX ADMIN — Medication 650 MILLIGRAM(S): at 21:38

## 2017-06-27 RX ADMIN — Medication 325 MILLIGRAM(S): at 05:15

## 2017-06-27 RX ADMIN — SODIUM CHLORIDE 100 MILLILITER(S): 9 INJECTION INTRAMUSCULAR; INTRAVENOUS; SUBCUTANEOUS at 18:31

## 2017-06-27 RX ADMIN — Medication 100 MILLIGRAM(S): at 05:15

## 2017-06-27 RX ADMIN — Medication 100 MILLIGRAM(S): at 21:38

## 2017-06-27 RX ADMIN — Medication 325 MILLIGRAM(S): at 18:35

## 2017-06-27 RX ADMIN — GABAPENTIN 300 MILLIGRAM(S): 400 CAPSULE ORAL at 05:15

## 2017-06-27 RX ADMIN — HYDROMORPHONE HYDROCHLORIDE 4 MILLIGRAM(S): 2 INJECTION INTRAMUSCULAR; INTRAVENOUS; SUBCUTANEOUS at 10:02

## 2017-06-27 RX ADMIN — PANTOPRAZOLE SODIUM 40 MILLIGRAM(S): 20 TABLET, DELAYED RELEASE ORAL at 05:16

## 2017-06-27 RX ADMIN — HYDROMORPHONE HYDROCHLORIDE 4 MILLIGRAM(S): 2 INJECTION INTRAMUSCULAR; INTRAVENOUS; SUBCUTANEOUS at 18:57

## 2017-06-27 RX ADMIN — CELECOXIB 200 MILLIGRAM(S): 200 CAPSULE ORAL at 09:23

## 2017-06-27 RX ADMIN — Medication 650 MILLIGRAM(S): at 14:31

## 2017-06-27 RX ADMIN — Medication 400 MILLIGRAM(S): at 05:16

## 2017-06-27 RX ADMIN — ATORVASTATIN CALCIUM 10 MILLIGRAM(S): 80 TABLET, FILM COATED ORAL at 21:39

## 2017-06-27 RX ADMIN — HYDROMORPHONE HYDROCHLORIDE 4 MILLIGRAM(S): 2 INJECTION INTRAMUSCULAR; INTRAVENOUS; SUBCUTANEOUS at 02:30

## 2017-06-27 RX ADMIN — Medication 500 MILLIGRAM(S): at 18:35

## 2017-06-27 RX ADMIN — HYDROMORPHONE HYDROCHLORIDE 4 MILLIGRAM(S): 2 INJECTION INTRAMUSCULAR; INTRAVENOUS; SUBCUTANEOUS at 14:31

## 2017-06-27 RX ADMIN — Medication 325 MILLIGRAM(S): at 09:23

## 2017-06-27 RX ADMIN — Medication 1 TABLET(S): at 12:39

## 2017-06-27 RX ADMIN — GABAPENTIN 300 MILLIGRAM(S): 400 CAPSULE ORAL at 14:31

## 2017-06-27 RX ADMIN — Medication 500 MILLIGRAM(S): at 05:15

## 2017-06-27 RX ADMIN — Medication 100 MILLIGRAM(S): at 14:31

## 2017-06-27 RX ADMIN — Medication 325 MILLIGRAM(S): at 12:39

## 2017-06-27 RX ADMIN — CELECOXIB 200 MILLIGRAM(S): 200 CAPSULE ORAL at 18:35

## 2017-06-27 RX ADMIN — MAGNESIUM HYDROXIDE 30 MILLILITER(S): 400 TABLET, CHEWABLE ORAL at 21:40

## 2017-06-27 RX ADMIN — HYDROMORPHONE HYDROCHLORIDE 4 MILLIGRAM(S): 2 INJECTION INTRAMUSCULAR; INTRAVENOUS; SUBCUTANEOUS at 06:00

## 2017-06-27 RX ADMIN — SODIUM CHLORIDE 500 MILLILITER(S): 9 INJECTION INTRAMUSCULAR; INTRAVENOUS; SUBCUTANEOUS at 18:30

## 2017-06-27 RX ADMIN — HYDROMORPHONE HYDROCHLORIDE 4 MILLIGRAM(S): 2 INJECTION INTRAMUSCULAR; INTRAVENOUS; SUBCUTANEOUS at 01:47

## 2017-06-27 NOTE — PROGRESS NOTE ADULT - ASSESSMENT
62 Male pmh bilateral hip oa admitted for now s/p left thr. 62 Male pmh severe bilateral hip oa admitted for,  now s/p bilateral thr.  post op day 1 for right thr.  mild dizziness all day. sbp 90's.

## 2017-06-27 NOTE — PROGRESS NOTE ADULT - PROBLEM SELECTOR PLAN 5
mild sbp in 90's with mild lightheadedness  ivf bolus then ns mild sbp in 90's with mild lightheadedness  ivf bolus then ns 100 hr.

## 2017-06-27 NOTE — PROGRESS NOTE ADULT - PROBLEM SELECTOR PLAN 1
sp l thr now s/p right thr  continue phys therapy  am labs   dc planning primary oa both hips  sp left thr now s/p right thr  continue phys therapy  am labs   dc planning for home w home care pt

## 2017-06-27 NOTE — PROGRESS NOTE ADULT - SUBJECTIVE AND OBJECTIVE BOX
SERGEYTRENA      62y   male  MRN-[id number]    ORTHOPEDIC SURGERY / DR. FLOWERS    POD # 1 S/P RIGHT THR  POD # 6 S/P LEFT THR     Vital Signs Last 24 Hrs  T(C): 37.2, Max: 37.2 (06-26 @ 09:11)  T(F): 98.9, Max: 99 (06-26 @ 09:11)  HR: 74 (71 - 86)  BP: 117/76 (100/70 - 135/91)  BP(mean): --  RR: 16 (14 - 16)  SpO2: 100% (97% - 100%)    BILATERAL  HIP :    DRESSING DRY AND INTACT  SOME EDEMA OVER LEFT HIP   GOOD MOTOR TO BILATERAL  LOWER EXTREMITY  NEURO-VASCULAR STATUS INTACT  NO CALF TENDERNESS    Hemoglobin: 11.5 (06-27 @ 04:46)  Hemoglobin: 12.7 (06-26 @ 14:42)  Hemoglobin: 12.9 (06-25 @ 08:53)    Hematocrit: 33.2 (06-27 @ 04:46)  Hematocrit: 37.8 (06-26 @ 14:42)  Hematocrit: 37.6 (06-25 @ 08:53)    06-27    139  |  102  |  10  ----------------------------<  122<H>  4.0   |  31  |  0.64    Ca    8.2<L>      27 Jun 2017 04:46                             ASSESSMENT &  PLAN: S/P  BILATERAL STAGED  TOTAL HIP REPLACEMENT    WEIGHT  BEARING AS TOLERATED, OOB AND AMBULATE, PHYSICAL THERAPY   DVT PROPHYLAXIS  MG PO BID  INCENTIVE SPIROMETRY   DISCHARGE PLANNING TO HOME WITH HOME CARE

## 2017-06-27 NOTE — PROGRESS NOTE ADULT - SUBJECTIVE AND OBJECTIVE BOX
Patient is a 62y old  Male who presents with a chief complaint of BILATERAL HIP PAIN  BILATERAL STAGED TOTAL HIP REPLACEMENT (21 Jun 2017 19:30)  c/o lightheadedness. sbp in 90's no chest pain or sob  ivf bolus ordered then ns 100 hr    INTERVAL HPI/OVERNIGHT EVENTS:    MEDICATIONS  (STANDING):  aspirin enteric coated 325 milliGRAM(s) Oral two times a day  gabapentin 300 milliGRAM(s) Oral three times a day  atorvastatin 10 milliGRAM(s) Oral at bedtime  pantoprazole    Tablet 40 milliGRAM(s) Oral before breakfast  celecoxib 200 milliGRAM(s) Oral two times a day after meals  docusate sodium 100 milliGRAM(s) Oral three times a day  ferrous    sulfate 325 milliGRAM(s) Oral three times a day with meals  folic acid 1 milliGRAM(s) Oral daily  multivitamin 1 Tablet(s) Oral daily  ascorbic acid 500 milliGRAM(s) Oral two times a day  acetaminophen   Tablet 650 milliGRAM(s) Oral every 8 hours    MEDICATIONS  (PRN):  ondansetron Injectable 4 milliGRAM(s) IV Push every 6 hours PRN Nausea and/or Vomiting  HYDROmorphone   Tablet 2 milliGRAM(s) Oral every 4 hours PRN Moderate Pain (4 - 6)  HYDROmorphone   Tablet 4 milliGRAM(s) Oral every 4 hours PRN Severe Pain (7 - 10)  morphine  - Injectable 4 milliGRAM(s) IV Push every 4 hours PRN Severe Pain (7 - 10)  zolpidem 5 milliGRAM(s) Oral at bedtime PRN Insomnia  glycerin Suppository - Adult 1 Suppository(s) Rectal daily PRN Constipation  magnesium hydroxide Suspension 30 milliLiter(s) Oral every 12 hours PRN Constipation      Allergies    No Known Allergies    Intolerances        REVIEW OF SYSTEMS:  CONSTITUTIONAL: No fever, No chills,No fatigue,No myalgia,No Body ache  EYES: No eye pain, visual disturbances, or discharge  ENMT:  No ear pain, No nose bleed, No vertigo; No sinus or throat pain  NECK: No pain, No stiffness  RESPIRATORY: No cough, wheezing, No  hemoptysis; No shortness of breath  CARDIOVASCULAR: No chest pain, palpitations, leg swelling  GASTROINTESTINAL: No abdominal or epigastric pain. No nausea, No vomiting; No diarrhea or constipation. [ ] BM  GENITOURINARY: No dysuria, No frequency, No urgency, No hematuria, or incontinence  NEUROLOGICAL: alert and oriented x 3,  No headaches, No dizziness, No numbness,  SKIN:   No itching, burning, rashes, or lesions   MUSCULOSKELETAL: No joint pain or swelling; No muscle pain, No back pain, pain right hip  PSYCHIATRIC: No depression, anxiety, mood swings, or difficulty sleeping  ROS  [ ] Unable to obtain   REST OF REVIEW Of SYSTEM - [ ] Normal     Height (cm): 177.8 (06-26 @ 09:11), 177.8 (06-13 @ 16:52)  Weight (kg): [id number] (06-26 @ 09:11), 93.9 (06-13 @ 16:52)  BMI (kg/m2): 29.7 (06-26 @ 09:11), 29.7 (06-13 @ 16:52)  BSA (m2): 2.12 (06-26 @ 09:11), 2.12 (06-13 @ 16:52)  Vital Signs Last 24 Hrs  T(C): 37.3 (27 Jun 2017 15:57), Max: 37.3 (27 Jun 2017 15:57)  T(F): 99.1 (27 Jun 2017 15:57), Max: 99.1 (27 Jun 2017 15:57)  HR: 80 (27 Jun 2017 15:57) (74 - 86)  BP: 99/65 (27 Jun 2017 15:57) (96/64 - 135/91)  BP(mean): --  RR: 16 (27 Jun 2017 15:57) (16 - 16)  SpO2: 97% (27 Jun 2017 15:57) (97% - 100%)  [x] room air   [ ] 02    PHYSICAL EXAM:  GENERAL:  No acute distresss,  [ ] Agitated, [ ] Lethargy, [ ] confused   HEAD:  normal  ENMT: normal  NECK:  normal    NERVOUS SYSTEM:  Alert & Oriented X3, no focal deficits [ ]Confusion  [ ] Encephalopathic [ ] Sedated [ ] Other  CHEST/LUNG: Clear to auscultation bilaterally,  [ ] decreased breath sounds at bases  [ ] wheezing   [ ] rhonchi  [ ] crackles  HEART:  Regular rate and rhythm, No murmurs, rubs, or gallops,  [ ] irregular   ABDOMEN:  soft, nontender, nondistended, positive bowel sounds   [ ] obese  EXTREMITIES: No clubbing, cyanosis or edema. pain right hip dressings intact bilaterally  SKIN: [ ] venous stasis skin changes    LABS:                        11.5   x     )-----------( x        ( 27 Jun 2017 04:46 )             33.2     27 Jun 2017 04:46    139    |  102    |  10     ----------------------------<  122    4.0     |  31     |  0.64     Ca    8.2        27 Jun 2017 04:46                        RADIOLOGY & ADDITIONAL TESTS:      Care Discussed with [X] Consultants  [ ] Patient  [ ] Family  [X]   /   [ ] Other; RN  DVT prophylaxis [ ] lovenox   [ ] subq heparin  [ ] coumadin  [ ] venodynes [ ] ambulating frequently at how risk for vte and no pharm         or  mechanical prophylaxis required    [ ] other  bid asa as per ortho  Discussed with pt @ bedside Patient is a 62y old  Male who presents with a chief complaint of BILATERAL HIP PAIN  BILATERAL STAGED TOTAL HIP REPLACEMENT (21 Jun 2017 19:30)  c/o lightheadedness. sbp in 90's no chest pain or sob  ivf bolus ordered then ns 100 hr    INTERVAL HPI/OVERNIGHT EVENTS:    MEDICATIONS  (STANDING):  aspirin enteric coated 325 milliGRAM(s) Oral two times a day  gabapentin 300 milliGRAM(s) Oral three times a day  atorvastatin 10 milliGRAM(s) Oral at bedtime  pantoprazole    Tablet 40 milliGRAM(s) Oral before breakfast  celecoxib 200 milliGRAM(s) Oral two times a day after meals  docusate sodium 100 milliGRAM(s) Oral three times a day  ferrous    sulfate 325 milliGRAM(s) Oral three times a day with meals  folic acid 1 milliGRAM(s) Oral daily  multivitamin 1 Tablet(s) Oral daily  ascorbic acid 500 milliGRAM(s) Oral two times a day  acetaminophen   Tablet 650 milliGRAM(s) Oral every 8 hours    MEDICATIONS  (PRN):  ondansetron Injectable 4 milliGRAM(s) IV Push every 6 hours PRN Nausea and/or Vomiting  HYDROmorphone   Tablet 2 milliGRAM(s) Oral every 4 hours PRN Moderate Pain (4 - 6)  HYDROmorphone   Tablet 4 milliGRAM(s) Oral every 4 hours PRN Severe Pain (7 - 10)  morphine  - Injectable 4 milliGRAM(s) IV Push every 4 hours PRN Severe Pain (7 - 10)  zolpidem 5 milliGRAM(s) Oral at bedtime PRN Insomnia  glycerin Suppository - Adult 1 Suppository(s) Rectal daily PRN Constipation  magnesium hydroxide Suspension 30 milliLiter(s) Oral every 12 hours PRN Constipation      Allergies    No Known Allergies    Intolerances        REVIEW OF SYSTEMS:  CONSTITUTIONAL: No fever, No chills,No fatigue,No myalgia,No Body ache  EYES: No eye pain, visual disturbances, or discharge  ENMT:  No ear pain, No nose bleed, No vertigo; No sinus or throat pain  NECK: No pain, No stiffness  RESPIRATORY: No cough, wheezing, No  hemoptysis; No shortness of breath  CARDIOVASCULAR: No chest pain, palpitations, leg swelling  GASTROINTESTINAL: No abdominal or epigastric pain. No nausea, No vomiting; No diarrhea or constipation. [ ] BM  GENITOURINARY: No dysuria, No frequency, No urgency, No hematuria, or incontinence  NEUROLOGICAL: alert and oriented x 3,  No headaches, mild dizziness lightheadedness  SKIN:   No itching, burning, rashes, or lesions   MUSCULOSKELETAL: No muscle pain, No back pain, pain right hip post op site and anterior r thigh  PSYCHIATRIC: No depression, anxiety, mood swings, or difficulty sleeping  ROS  [ ] Unable to obtain   REST OF REVIEW Of SYSTEM - [ ] Normal     Height (cm): 177.8 (06-26 @ 09:11), 177.8 (06-13 @ 16:52)  Weight (kg): [id number] (06-26 @ 09:11), 93.9 (06-13 @ 16:52)  BMI (kg/m2): 29.7 (06-26 @ 09:11), 29.7 (06-13 @ 16:52)  BSA (m2): 2.12 (06-26 @ 09:11), 2.12 (06-13 @ 16:52)  Vital Signs Last 24 Hrs  T(C): 37.3 (27 Jun 2017 15:57), Max: 37.3 (27 Jun 2017 15:57)  T(F): 99.1 (27 Jun 2017 15:57), Max: 99.1 (27 Jun 2017 15:57)  HR: 80 (27 Jun 2017 15:57) (74 - 86)  BP: 99/65 (27 Jun 2017 15:57) (96/64 - 135/91)  BP(mean): --  RR: 16 (27 Jun 2017 15:57) (16 - 16)  SpO2: 97% (27 Jun 2017 15:57) (97% - 100%)  [x] room air   [ ] 02    PHYSICAL EXAM:  GENERAL:  visibly uncomfortable in pain post op right hip,  [ ] Agitated, [ ] Lethargy, [ ] confused   HEAD:  normal  ENMT: normal  NECK:  normal    NERVOUS SYSTEM:  Alert & Oriented X3, no focal deficits [ ]Confusion  [ ] Encephalopathic [ ] Sedated [ ] Other  CHEST/LUNG: Clear to auscultation bilaterally,  [ ] decreased breath sounds at bases  [ ] wheezing   [ ] rhonchi  [ ] crackles  HEART:  Regular rate and rhythm, No murmurs, rubs, or gallops,  [ ] irregular   ABDOMEN:  soft, nontender, nondistended, positive bowel sounds   [ ] obese  EXTREMITIES: No clubbing, cyanosis or edema. pain right hip, right anterior thight, dressings intact bilaterally  no calf pain  SKIN: [ ] venous stasis skin changes    LABS:                        11.5   x     )-----------( x        ( 27 Jun 2017 04:46 )             33.2     27 Jun 2017 04:46    139    |  102    |  10     ----------------------------<  122    4.0     |  31     |  0.64     Ca    8.2        27 Jun 2017 04:46                        RADIOLOGY & ADDITIONAL TESTS:      Care Discussed with [X] Consultants  [ ] Patient  [ ] Family  [X]   /   [ ] Other; RN  DVT prophylaxis [ ] lovenox   [ ] subq heparin  [ ] coumadin  [ ] venodynes [ ] ambulating frequently at how risk for vte and no pharm         or  mechanical prophylaxis required    [ ] other  bid asa as per ortho  Discussed with pt @ bedside

## 2017-06-28 LAB
HCT VFR BLD CALC: 29.5 % — LOW (ref 39–50)
HGB BLD-MCNC: 10 G/DL — LOW (ref 13–17)
SURGICAL PATHOLOGY FINAL REPORT - CH: SIGNIFICANT CHANGE UP

## 2017-06-28 RX ADMIN — HYDROMORPHONE HYDROCHLORIDE 4 MILLIGRAM(S): 2 INJECTION INTRAMUSCULAR; INTRAVENOUS; SUBCUTANEOUS at 16:00

## 2017-06-28 RX ADMIN — HYDROMORPHONE HYDROCHLORIDE 4 MILLIGRAM(S): 2 INJECTION INTRAMUSCULAR; INTRAVENOUS; SUBCUTANEOUS at 15:27

## 2017-06-28 RX ADMIN — GABAPENTIN 300 MILLIGRAM(S): 400 CAPSULE ORAL at 13:45

## 2017-06-28 RX ADMIN — Medication 100 MILLIGRAM(S): at 21:50

## 2017-06-28 RX ADMIN — Medication 325 MILLIGRAM(S): at 17:49

## 2017-06-28 RX ADMIN — Medication 650 MILLIGRAM(S): at 05:06

## 2017-06-28 RX ADMIN — Medication 650 MILLIGRAM(S): at 21:50

## 2017-06-28 RX ADMIN — Medication 1 TABLET(S): at 11:37

## 2017-06-28 RX ADMIN — Medication 325 MILLIGRAM(S): at 08:36

## 2017-06-28 RX ADMIN — Medication 100 MILLIGRAM(S): at 13:45

## 2017-06-28 RX ADMIN — Medication 500 MILLIGRAM(S): at 05:07

## 2017-06-28 RX ADMIN — CELECOXIB 200 MILLIGRAM(S): 200 CAPSULE ORAL at 08:36

## 2017-06-28 RX ADMIN — GABAPENTIN 300 MILLIGRAM(S): 400 CAPSULE ORAL at 21:50

## 2017-06-28 RX ADMIN — Medication 650 MILLIGRAM(S): at 13:42

## 2017-06-28 RX ADMIN — CELECOXIB 200 MILLIGRAM(S): 200 CAPSULE ORAL at 18:56

## 2017-06-28 RX ADMIN — Medication 100 MILLIGRAM(S): at 05:07

## 2017-06-28 RX ADMIN — HYDROMORPHONE HYDROCHLORIDE 2 MILLIGRAM(S): 2 INJECTION INTRAMUSCULAR; INTRAVENOUS; SUBCUTANEOUS at 00:12

## 2017-06-28 RX ADMIN — Medication 1 MILLIGRAM(S): at 11:37

## 2017-06-28 RX ADMIN — GABAPENTIN 300 MILLIGRAM(S): 400 CAPSULE ORAL at 05:08

## 2017-06-28 RX ADMIN — Medication 500 MILLIGRAM(S): at 17:49

## 2017-06-28 RX ADMIN — ATORVASTATIN CALCIUM 10 MILLIGRAM(S): 80 TABLET, FILM COATED ORAL at 21:50

## 2017-06-28 RX ADMIN — PANTOPRAZOLE SODIUM 40 MILLIGRAM(S): 20 TABLET, DELAYED RELEASE ORAL at 05:07

## 2017-06-28 RX ADMIN — CELECOXIB 200 MILLIGRAM(S): 200 CAPSULE ORAL at 17:49

## 2017-06-28 RX ADMIN — Medication 325 MILLIGRAM(S): at 05:06

## 2017-06-28 RX ADMIN — MAGNESIUM HYDROXIDE 30 MILLILITER(S): 400 TABLET, CHEWABLE ORAL at 13:43

## 2017-06-28 RX ADMIN — CELECOXIB 200 MILLIGRAM(S): 200 CAPSULE ORAL at 12:59

## 2017-06-28 RX ADMIN — Medication 325 MILLIGRAM(S): at 11:37

## 2017-06-28 RX ADMIN — Medication 1 SUPPOSITORY(S): at 08:36

## 2017-06-28 NOTE — PROGRESS NOTE ADULT - ASSESSMENT
62 Male pmh severe bilateral hip oa admitted for,  now s/p bilateral thr.  post op day 1 for right thr.  mild dizziness all day. sbp 90's.

## 2017-06-28 NOTE — PROGRESS NOTE ADULT - PROBLEM SELECTOR PLAN 5
mild sbp in 90's asymptomatic. improved after ivf given.  likely secondary to pain meds  cotinue to monitor for sx

## 2017-06-28 NOTE — PROGRESS NOTE ADULT - SUBJECTIVE AND OBJECTIVE BOX
SERGEYTRENA      62y   male  [id number]    ORTHOPEDIC SURGERY / DR. FLOWERS    POD # 2 S/P RIGHT THR  POD # 7 S/P LEFT THR     Vital Signs Last 24 Hrs  T(C): 37.2 (28 Jun 2017 04:45), Max: 37.3 (27 Jun 2017 15:57)  T(F): 98.9 (28 Jun 2017 04:45), Max: 99.1 (27 Jun 2017 15:57)  HR: 76 (28 Jun 2017 04:45) (74 - 84)  BP: 103/64 (28 Jun 2017 04:45) (96/64 - 103/64)  BP(mean): --  RR: 16 (28 Jun 2017 04:45) (16 - 16)  SpO2: 98% (28 Jun 2017 04:45) (97% - 98%)    BILATERAL  HIP :    DRESSING DRY AND INTACT  GOOD MOTOR TO BILATERAL  LOWER EXTREMITY  NEURO-VASCULAR STATUS INTACT  NO CALF TENDERNESS    Hemoglobin: 10.0 (06-28 @ 04:37)  Hemoglobin: 11.0 (06-27 @ 19:16)  Hemoglobin: 11.5 (06-27 @ 04:46)  Hemoglobin: 12.7 (06-26 @ 14:42)  Hemoglobin: 12.9 (06-25 @ 08:53)    Hematocrit: 29.5 (06-28 @ 04:37)  Hematocrit: 32.4 (06-27 @ 19:16)  Hematocrit: 33.2 (06-27 @ 04:46)  Hematocrit: 37.8 (06-26 @ 14:42)  Hematocrit: 37.6 (06-25 @ 08:53)    06-27    139  |  102  |  10  ----------------------------<  122<H>  4.0   |  31  |  0.64    Ca    8.2<L>      27 Jun 2017 04:46                              ASSESSMENT &  PLAN:   S/P STAGED BILATERAL TOTAL HIP REPLACEMENT    WEIGHT  BEARING AS TOLERATED, OOB AND AMBULATE, PHYSICAL THERAPY   DVT PROPHYLAXIS  MG PO BID  INCENTIVE SPIROMETRY     HYPOTENSION  HEMOGLOBIN AND HEMATOCRIT STABLE, IMPROVED WITH IV FLUID HYDRATION       DISCHARGE PLANNING TO HOME WITH HOME CARE

## 2017-06-28 NOTE — PROGRESS NOTE ADULT - PROBLEM SELECTOR PLAN 1
primary oa both hips  sp left thr now s/p right thr  continue phys therapy  am labs   dc planning for home w home care pt in am tomorrow

## 2017-06-28 NOTE — PROGRESS NOTE ADULT - SUBJECTIVE AND OBJECTIVE BOX
Patient is a 62y old  Male who presents with a chief complaint of BILATERAL HIP PAIN  BILATERAL STAGED TOTAL HIP REPLACEMENT (21 Jun 2017 19:30)      INTERVAL HPI/OVERNIGHT EVENTS:    MEDICATIONS  (STANDING):  aspirin enteric coated 325 milliGRAM(s) Oral two times a day  gabapentin 300 milliGRAM(s) Oral three times a day  atorvastatin 10 milliGRAM(s) Oral at bedtime  pantoprazole    Tablet 40 milliGRAM(s) Oral before breakfast  celecoxib 200 milliGRAM(s) Oral two times a day after meals  docusate sodium 100 milliGRAM(s) Oral three times a day  ferrous    sulfate 325 milliGRAM(s) Oral three times a day with meals  folic acid 1 milliGRAM(s) Oral daily  multivitamin 1 Tablet(s) Oral daily  ascorbic acid 500 milliGRAM(s) Oral two times a day  acetaminophen   Tablet 650 milliGRAM(s) Oral every 8 hours    MEDICATIONS  (PRN):  ondansetron Injectable 4 milliGRAM(s) IV Push every 6 hours PRN Nausea and/or Vomiting  HYDROmorphone   Tablet 2 milliGRAM(s) Oral every 4 hours PRN Moderate Pain (4 - 6)  HYDROmorphone   Tablet 4 milliGRAM(s) Oral every 4 hours PRN Severe Pain (7 - 10)  morphine  - Injectable 4 milliGRAM(s) IV Push every 4 hours PRN Severe Pain (7 - 10)  zolpidem 5 milliGRAM(s) Oral at bedtime PRN Insomnia  glycerin Suppository - Adult 1 Suppository(s) Rectal daily PRN Constipation  magnesium hydroxide Suspension 30 milliLiter(s) Oral every 12 hours PRN Constipation      Allergies    No Known Allergies    Intolerances        REVIEW OF SYSTEMS:  CONSTITUTIONAL: No fever, No chills,No fatigue,No myalgia,No Body ache  EYES: No eye pain, visual disturbances, or discharge  ENMT:  No ear pain, No nose bleed, No vertigo; No sinus or throat pain  NECK: No pain, No stiffness  RESPIRATORY: No cough, wheezing, No  hemoptysis; No shortness of breath  CARDIOVASCULAR: No chest pain, palpitations, leg swelling  GASTROINTESTINAL: No abdominal or epigastric pain. No nausea, No vomiting; No diarrhea or constipation. [ ] BM  GENITOURINARY: No dysuria, No frequency, No urgency, No hematuria, or incontinence  NEUROLOGICAL: alert and oriented x 3,  No headaches, No dizziness, No numbness,  SKIN:   No itching, burning, rashes, or lesions   MUSCULOSKELETAL: No joint pain or swelling; No muscle pain, No back pain, No extremity pain  PSYCHIATRIC: No depression, anxiety, mood swings, or difficulty sleeping  ROS  [ ] Unable to obtain   REST OF REVIEW Of SYSTEM - [ ] Normal     Height (cm): 177.8 (06-26 @ 09:11), 177.8 (06-13 @ 16:52)  Weight (kg): [id number] (06-26 @ 09:11), 93.9 (06-13 @ 16:52)  BMI (kg/m2): 29.7 (06-26 @ 09:11), 29.7 (06-13 @ 16:52)  BSA (m2): 2.12 (06-26 @ 09:11), 2.12 (06-13 @ 16:52)  Vital Signs Last 24 Hrs  T(C): 36.7 (28 Jun 2017 08:21), Max: 37.3 (27 Jun 2017 15:57)  T(F): 98.1 (28 Jun 2017 08:21), Max: 99.1 (27 Jun 2017 15:57)  HR: 85 (28 Jun 2017 08:21) (76 - 85)  BP: 90/60 (28 Jun 2017 15:39) (90/60 - 103/64)  BP(mean): --  RR: 16 (28 Jun 2017 08:21) (16 - 16)  SpO2: 97% (28 Jun 2017 08:21) (97% - 98%)  [ ] room air   [ ] 02    PHYSICAL EXAM:  GENERAL:  No acute distresss,  [ ] Agitated, [ ] Lethargy, [ ] confused   HEAD:  normal  ENMT: normal  NECK:  normal    NERVOUS SYSTEM:  Alert & Oriented X3, no focal deficits [ ]Confusion  [ ] Encephalopathic [ ] Sedated [ ] Other  CHEST/LUNG: Clear to auscultation bilaterally,  [ ] decreased breath sounds at bases  [ ] wheezing   [ ] rhonchi  [ ] crackles  HEART:  Regular rate and rhythm, No murmurs, rubs, or gallops,  [ ] irregular   ABDOMEN:  soft, nontender, nondistended, positive bowel sounds   [ ] obese  EXTREMITIES: No clubbing, cyanosis or edema  SKIN: [ ] venous stasis skin changes    LABS:                        10.0   x     )-----------( x        ( 28 Jun 2017 04:37 )             29.5       Ca    8.2        27 Jun 2017 04:46            CAPILLARY BLOOD GLUCOSE        Cultures          RADIOLOGY & ADDITIONAL TESTS:      Care Discussed with [X] Consultants  [ ] Patient  [ ] Family  [X]   /   [ ] Other; RN  DVT prophylaxis [ ] lovenox   [ ] subq heparin  [ ] coumadin  [ ] venodynes [ ] ambulating frequently at how risk for vte and no pharm         or  mechanical prophylaxis required    [ ] other   Advanced directive:    [ ]pt has hcp     [ ] pt declined to assign hcp  Discussed with pt @ bedside Patient is a 62y old  Male who presents with a chief complaint of BILATERAL HIP PAIN  BILATERAL STAGED TOTAL HIP REPLACEMENT (21 Jun 2017 19:30)         INTERVAL HPI/OVERNIGHT EVENTS: despite persistent low bp pt feels much better today. up ambulated 4 times without dizziness lightheadedness. i checked bp myself 102/68 at about 330 pm. no chest pain or sob. right hip pain much improved today.    MEDICATIONS  (STANDING):  aspirin enteric coated 325 milliGRAM(s) Oral two times a day  gabapentin 300 milliGRAM(s) Oral three times a day  atorvastatin 10 milliGRAM(s) Oral at bedtime  pantoprazole    Tablet 40 milliGRAM(s) Oral before breakfast  celecoxib 200 milliGRAM(s) Oral two times a day after meals  docusate sodium 100 milliGRAM(s) Oral three times a day  ferrous    sulfate 325 milliGRAM(s) Oral three times a day with meals  folic acid 1 milliGRAM(s) Oral daily  multivitamin 1 Tablet(s) Oral daily  ascorbic acid 500 milliGRAM(s) Oral two times a day  acetaminophen   Tablet 650 milliGRAM(s) Oral every 8 hours    MEDICATIONS  (PRN):  ondansetron Injectable 4 milliGRAM(s) IV Push every 6 hours PRN Nausea and/or Vomiting  HYDROmorphone   Tablet 2 milliGRAM(s) Oral every 4 hours PRN Moderate Pain (4 - 6)  HYDROmorphone   Tablet 4 milliGRAM(s) Oral every 4 hours PRN Severe Pain (7 - 10)  morphine  - Injectable 4 milliGRAM(s) IV Push every 4 hours PRN Severe Pain (7 - 10)  zolpidem 5 milliGRAM(s) Oral at bedtime PRN Insomnia  glycerin Suppository - Adult 1 Suppository(s) Rectal daily PRN Constipation  magnesium hydroxide Suspension 30 milliLiter(s) Oral every 12 hours PRN Constipation      Allergies    No Known Allergies    Intolerances        REVIEW OF SYSTEMS:  CONSTITUTIONAL: No fever, No chills,No fatigue,No myalgia,No Body ache  EYES: No eye pain, visual disturbances, or discharge  ENMT:  No ear pain, No nose bleed, No vertigo; No sinus or throat pain  NECK: No pain, No stiffness  RESPIRATORY: No cough, wheezing, No  hemoptysis; No shortness of breath  CARDIOVASCULAR: No chest pain, palpitations, leg swelling  GASTROINTESTINAL: No abdominal or epigastric pain. No nausea, No vomiting; No diarrhea or constipation. [ ] BM  GENITOURINARY: No dysuria, No frequency, No urgency, No hematuria, or incontinence  NEUROLOGICAL: alert and oriented x 3,  No headaches, No dizziness, No numbness,  SKIN:   No itching, burning, rashes, or lesions   MUSCULOSKELETAL: No joint pain or swelling; No muscle pain, No back pain, pain right hip present better. dsg left hip intact  PSYCHIATRIC: No depression, anxiety, mood swings, or difficulty sleeping  ROS  [ ] Unable to obtain   REST OF REVIEW Of SYSTEM - [ ] Normal     Height (cm): 177.8 (06-26 @ 09:11), 177.8 (06-13 @ 16:52)  Weight (kg): [id number] (06-26 @ 09:11), 93.9 (06-13 @ 16:52)  BMI (kg/m2): 29.7 (06-26 @ 09:11), 29.7 (06-13 @ 16:52)  BSA (m2): 2.12 (06-26 @ 09:11), 2.12 (06-13 @ 16:52)  Vital Signs Last 24 Hrs  T(C): 36.7 (28 Jun 2017 08:21), Max: 37.3 (27 Jun 2017 15:57)  T(F): 98.1 (28 Jun 2017 08:21), Max: 99.1 (27 Jun 2017 15:57)  HR: 85 (28 Jun 2017 08:21) (76 - 85)  BP: 90/60 (28 Jun 2017 15:39) (90/60 - 103/64)   BP(mean): --  RR: 16 (28 Jun 2017 08:21) (16 - 16)  SpO2: 97% (28 Jun 2017 08:21) (97% - 98%)  [x ] room air   [ ] 02    PHYSICAL EXAM:  GENERAL:  No acute distresss,   [ ] Agitated, [ ] Lethargy, [ ] confused   HEAD:  normal  ENMT: normal  NECK:  normal    NERVOUS SYSTEM:  Alert & Oriented X3, no focal deficits [ ]Confusion  [ ] Encephalopathic [ ] Sedated [ ] Other  CHEST/LUNG: Clear to auscultation bilaterally,  [ ] decreased breath sounds at bases  [ ] wheezing   [ ] rhonchi  [ ] crackles  HEART:  Regular rate and rhythm, No murmurs, rubs, or gallops,  [ ] irregular   ABDOMEN:  soft, nontender, nondistended, positive bowel sounds   [ ] obese  EXTREMITIES: No clubbing, cyanosis or edema. pain  right hip improved. bilateral hip dsg intact no calf pain.  SKIN: [ ] venous stasis skin changes    LABS:                        10.0   x     )-----------( x        ( 28 Jun 2017 04:37 )             29.5       Ca    8.2        27 Jun 2017 04:46            CAPILLARY BLOOD GLUCOSE        Cultures          RADIOLOGY & ADDITIONAL TESTS:      Care Discussed with [X] Consultants  [ ] Patient  [ ] Family  [X]   /   [ ] Other; RN  DVT prophylaxis [ ] lovenox   [ ] subq heparin  [ ] coumadin  [ ] venodynes [ ] ambulating frequently at how risk for vte and no pharm         or  mechanical prophylaxis required    [ ] other   Advanced directive:    [ ]pt has hcp     [ ] pt declined to assign hcp  Discussed with pt @ bedside

## 2017-06-29 VITALS
HEART RATE: 93 BPM | RESPIRATION RATE: 16 BRPM | DIASTOLIC BLOOD PRESSURE: 69 MMHG | SYSTOLIC BLOOD PRESSURE: 103 MMHG | OXYGEN SATURATION: 99 % | TEMPERATURE: 98 F

## 2017-06-29 PROCEDURE — 85610 PROTHROMBIN TIME: CPT

## 2017-06-29 PROCEDURE — 96375 TX/PRO/DX INJ NEW DRUG ADDON: CPT

## 2017-06-29 PROCEDURE — 88311 DECALCIFY TISSUE: CPT

## 2017-06-29 PROCEDURE — 86920 COMPATIBILITY TEST SPIN: CPT

## 2017-06-29 PROCEDURE — 97161 PT EVAL LOW COMPLEX 20 MIN: CPT

## 2017-06-29 PROCEDURE — 86900 BLOOD TYPING SEROLOGIC ABO: CPT

## 2017-06-29 PROCEDURE — 96372 THER/PROPH/DIAG INJ SC/IM: CPT | Mod: 59

## 2017-06-29 PROCEDURE — 85018 HEMOGLOBIN: CPT

## 2017-06-29 PROCEDURE — 85027 COMPLETE CBC AUTOMATED: CPT

## 2017-06-29 PROCEDURE — C1713: CPT

## 2017-06-29 PROCEDURE — 73501 X-RAY EXAM HIP UNI 1 VIEW: CPT

## 2017-06-29 PROCEDURE — 86850 RBC ANTIBODY SCREEN: CPT

## 2017-06-29 PROCEDURE — 99285 EMERGENCY DEPT VISIT HI MDM: CPT | Mod: 25

## 2017-06-29 PROCEDURE — 97530 THERAPEUTIC ACTIVITIES: CPT

## 2017-06-29 PROCEDURE — 96376 TX/PRO/DX INJ SAME DRUG ADON: CPT

## 2017-06-29 PROCEDURE — 97116 GAIT TRAINING THERAPY: CPT

## 2017-06-29 PROCEDURE — 86901 BLOOD TYPING SEROLOGIC RH(D): CPT

## 2017-06-29 PROCEDURE — 80048 BASIC METABOLIC PNL TOTAL CA: CPT

## 2017-06-29 PROCEDURE — 96374 THER/PROPH/DIAG INJ IV PUSH: CPT

## 2017-06-29 PROCEDURE — C1776: CPT

## 2017-06-29 PROCEDURE — 97110 THERAPEUTIC EXERCISES: CPT

## 2017-06-29 PROCEDURE — 88305 TISSUE EXAM BY PATHOLOGIST: CPT

## 2017-06-29 RX ORDER — PANTOPRAZOLE SODIUM 20 MG/1
1 TABLET, DELAYED RELEASE ORAL
Qty: 30 | Refills: 0 | OUTPATIENT
Start: 2017-06-29 | End: 2017-07-29

## 2017-06-29 RX ORDER — ASPIRIN/CALCIUM CARB/MAGNESIUM 324 MG
1 TABLET ORAL
Qty: 60 | Refills: 0 | OUTPATIENT
Start: 2017-06-29 | End: 2017-07-29

## 2017-06-29 RX ORDER — HYDROMORPHONE HYDROCHLORIDE 2 MG/ML
1 INJECTION INTRAMUSCULAR; INTRAVENOUS; SUBCUTANEOUS
Qty: 30 | Refills: 0 | OUTPATIENT
Start: 2017-06-29

## 2017-06-29 RX ORDER — CELECOXIB 200 MG/1
1 CAPSULE ORAL
Qty: 0 | Refills: 0 | COMMUNITY
Start: 2017-06-29

## 2017-06-29 RX ADMIN — Medication 500 MILLIGRAM(S): at 05:20

## 2017-06-29 RX ADMIN — Medication 325 MILLIGRAM(S): at 08:38

## 2017-06-29 RX ADMIN — Medication 100 MILLIGRAM(S): at 05:20

## 2017-06-29 RX ADMIN — HYDROMORPHONE HYDROCHLORIDE 2 MILLIGRAM(S): 2 INJECTION INTRAMUSCULAR; INTRAVENOUS; SUBCUTANEOUS at 04:02

## 2017-06-29 RX ADMIN — HYDROMORPHONE HYDROCHLORIDE 4 MILLIGRAM(S): 2 INJECTION INTRAMUSCULAR; INTRAVENOUS; SUBCUTANEOUS at 08:40

## 2017-06-29 RX ADMIN — CELECOXIB 200 MILLIGRAM(S): 200 CAPSULE ORAL at 08:38

## 2017-06-29 RX ADMIN — HYDROMORPHONE HYDROCHLORIDE 2 MILLIGRAM(S): 2 INJECTION INTRAMUSCULAR; INTRAVENOUS; SUBCUTANEOUS at 05:13

## 2017-06-29 RX ADMIN — Medication 1 SUPPOSITORY(S): at 08:42

## 2017-06-29 RX ADMIN — Medication 650 MILLIGRAM(S): at 05:20

## 2017-06-29 RX ADMIN — PANTOPRAZOLE SODIUM 40 MILLIGRAM(S): 20 TABLET, DELAYED RELEASE ORAL at 05:19

## 2017-06-29 RX ADMIN — GABAPENTIN 300 MILLIGRAM(S): 400 CAPSULE ORAL at 05:20

## 2017-06-29 RX ADMIN — Medication 325 MILLIGRAM(S): at 05:20

## 2017-06-29 NOTE — PROGRESS NOTE ADULT - PROVIDER SPECIALTY LIST ADULT
Anesthesia
Anesthesia
Hospitalist
Hospitalist
Internal Medicine
Orthopedics
Internal Medicine
Orthopedics

## 2017-06-29 NOTE — PROGRESS NOTE ADULT - SUBJECTIVE AND OBJECTIVE BOX
JESSY RINCONIP      62y   male  [id number]    ORTHOPEDIC SURGERY / DR. FLOWERS    POD # 3 S/P RIGHT THR  POD # 8 S/P LEFT THR     Vital Signs Last 24 Hrs  T(C): 37 (28 Jun 2017 23:30), Max: 37.2 (28 Jun 2017 16:13)  T(F): 98.6 (28 Jun 2017 23:30), Max: 99 (28 Jun 2017 16:13)  HR: 69 (29 Jun 2017 03:59) (69 - 85)  BP: 100/62 (29 Jun 2017 03:59) (90/60 - 100/62)  BP(mean): --  RR: 16 (28 Jun 2017 23:30) (16 - 16)  SpO2: 98% (29 Jun 2017 03:59) (97% - 99%)    BILATERAL  HIP :    WOUND DRY AND INTACT  GOOD MOTOR TO BILATERAL  LOWER EXTREMITY  NEURO-VASCULAR STATUS INTACT  NO CALF TENDERNESS    Hemoglobin: 10.0 (06-28 @ 04:37)  Hemoglobin: 11.0 (06-27 @ 19:16)  Hemoglobin: 11.5 (06-27 @ 04:46)  Hemoglobin: 12.7 (06-26 @ 14:42)    Hematocrit: 29.5 (06-28 @ 04:37)  Hematocrit: 32.4 (06-27 @ 19:16)  Hematocrit: 33.2 (06-27 @ 04:46)  Hematocrit: 37.8 (06-26 @ 14:42)                                ASSESSMENT &  PLAN: S/P STAGED BILATERAL TOTAL HIP REPLACEMENT    WEIGHT  BEARING AS TOLERATED, OOB AND AMBULATE, PHYSICAL THERAPY   DVT PROPHYLAXIS  MG PO BID  INCENTIVE SPIROMETRY   DISCHARGE PLANNING TO HOME WITH HOME CARE TODAY  NEW AQUACEL DRESSING APPLIED

## 2017-07-01 DIAGNOSIS — M54.40 LUMBAGO WITH SCIATICA, UNSPECIFIED SIDE: ICD-10-CM

## 2017-07-01 DIAGNOSIS — I95.9 HYPOTENSION, UNSPECIFIED: ICD-10-CM

## 2017-07-01 DIAGNOSIS — M16.0 BILATERAL PRIMARY OSTEOARTHRITIS OF HIP: ICD-10-CM

## 2017-07-01 DIAGNOSIS — D62 ACUTE POSTHEMORRHAGIC ANEMIA: ICD-10-CM

## 2017-12-19 NOTE — PRE-OP CHECKLIST - BMI (KG/M2)
No longer drives, has RW and rollator if needed.  Patient reports not using device at home, but all his meds say" they make you dizzy, unsteady". 29.7

## 2020-01-16 NOTE — PATIENT PROFILE ADULT. - ATTEMPT TO OOB
Review of Systems    Constitutional: (-) fever/ chills (-) weight loss  Eyes/ENT: (-) blurry vision, (-) epistaxis (-) sore throat (-) ear pain  Cardiovascular: (-) chest pain, (-) syncope (-) palpitations  Respiratory: (-) cough, (+) shortness of breath  Gastrointestinal: (-) vomiting, (-) diarrhea (-) abdominal pain  Musculoskeletal: (-) neck pain, (-) back pain, (-) joint pain (-) pedal edema   Integumentary: (-) rash, (-) swelling  Neurological: (-) headache, (-) altered mental status  Psychiatric: (-) hallucinations or depression   Allergic/Immunologic: (-) pruritus no

## 2021-03-03 NOTE — PHYSICAL THERAPY INITIAL EVALUATION ADULT - PERTINENT HX OF CURRENT PROBLEM, REHAB EVAL
L THR, staged R THR 6/26
pt is a 61 y/o male, s/p above surgery
No significant past surgical history

## 2022-03-10 NOTE — PATIENT PROFILE ADULT. - AS SC BRADEN MOISTURE
(4) rarely moist H Plasty Text: Given the location of the defect, shape of the defect and the proximity to free margins a H-plasty was deemed most appropriate for repair.  Using a sterile surgical marker, the appropriate advancement arms of the H-plasty were drawn incorporating the defect and placing the expected incisions within the relaxed skin tension lines where possible. The area thus outlined was incised deep to adipose tissue with a #[id number] scalpel blade. The skin margins were undermined to an appropriate distance in all directions utilizing iris scissors.  The opposing advancement arms were then advanced into place in opposite direction and anchored with interrupted buried subcutaneous sutures.

## 2023-10-23 NOTE — H&P PST ADULT - PROBLEM SELECTOR PLAN 2
to assess the swallow mechanism; r/o dysphagia. Labs- CBC, CMP, PT/PTT, Nose Cx. T&S and EKG, Left hip x-ray  MC with Dr. Padilla  Pre op and 3 days of Hibiclens instructions reviewed and given. Instructed to hold supplements and Meloxicam one week pre op. Avoid all NSAIDs. Take Gabapentin am of surgery with sip of water. Percocet as needed. Celebrex night before surgery, as per Dr. Leigh.

## 2024-02-06 NOTE — DISCHARGE NOTE ADULT - NSCORESITESY/N_GEN_A_CORE_RD
Patient called in stating that she needs an approval sent to the dentist to start a course of Amoxicillin. The patient needs to have a tooth extracted. Please advise.    Yes

## 2025-07-08 NOTE — ANESTHESIA FOLLOW-UP NOTE - NSEVALATION_GEN_ALL_CORE
BP is not at goal at home.  Goal BP <140/90    Advised to continue taking amlodipine 10 mg daily, irbesartan 300 mg daily, and doxazosin 4 mg daily    Will add HCTZ 12.5 mg daily since she has mild edema and BP is not well controlled. Will monitor serum sodium with repeat labs in 2 weeks.    Continue low salt diet  Increase water intake discussed again today  Advised to measure BP at home and bring in log book to review at next appointment     All questions were answered/No apparent complications or complaints reguarding anesthesia care at this time